# Patient Record
Sex: FEMALE | Race: BLACK OR AFRICAN AMERICAN | Employment: UNEMPLOYED | URBAN - METROPOLITAN AREA
[De-identification: names, ages, dates, MRNs, and addresses within clinical notes are randomized per-mention and may not be internally consistent; named-entity substitution may affect disease eponyms.]

---

## 2017-08-15 ENCOUNTER — OP HISTORICAL/CONVERTED ENCOUNTER (OUTPATIENT)
Dept: OTHER | Age: 27
End: 2017-08-15

## 2017-09-07 ENCOUNTER — IP HISTORICAL/CONVERTED ENCOUNTER (OUTPATIENT)
Dept: OTHER | Age: 27
End: 2017-09-07

## 2019-09-25 ENCOUNTER — IP HISTORICAL/CONVERTED ENCOUNTER (OUTPATIENT)
Dept: OTHER | Age: 29
End: 2019-09-25

## 2020-10-20 LAB
ANTIBODY SCREEN, EXTERNAL: NEGATIVE
HBSAG, EXTERNAL: NEGATIVE
RPR, EXTERNAL: NON REACTIVE
RUBELLA, EXTERNAL: NORMAL
TYPE, ABO & RH, EXTERNAL: NORMAL

## 2020-10-22 ENCOUNTER — ROUTINE PRENATAL (OUTPATIENT)
Dept: OBGYN CLINIC | Age: 30
End: 2020-10-22
Payer: MEDICAID

## 2020-10-22 VITALS
WEIGHT: 136 LBS | DIASTOLIC BLOOD PRESSURE: 69 MMHG | BODY MASS INDEX: 25.03 KG/M2 | HEIGHT: 62 IN | SYSTOLIC BLOOD PRESSURE: 110 MMHG

## 2020-10-22 DIAGNOSIS — Z34.83 MULTIGRAVIDA IN THIRD TRIMESTER: Primary | ICD-10-CM

## 2020-10-22 DIAGNOSIS — O09.33 PRENATAL CARE INSUFFICIENT, THIRD TRIMESTER: ICD-10-CM

## 2020-10-22 PROCEDURE — 0502F SUBSEQUENT PRENATAL CARE: CPT | Performed by: OBSTETRICS & GYNECOLOGY

## 2020-10-22 PROCEDURE — MISCGLOBALOB GLOBAL OB: Performed by: OBSTETRICS & GYNECOLOGY

## 2020-10-22 NOTE — PROGRESS NOTES
Extremely limited prenatal care secondary to insurance reasons    Denies bleeding ctxs, leakage. Reports normal fetal movement    Fetal tone, breathing movement noted.       Normal AFV - RUQ pocket 6 cm

## 2020-10-28 ENCOUNTER — TELEPHONE (OUTPATIENT)
Dept: OBGYN CLINIC | Age: 30
End: 2020-10-28

## 2020-10-29 LAB
ABO GROUP BLD: ABNORMAL
ABOUT THE TEST: NORMAL
APPEARANCE UR: CLEAR
BASOPHILS # BLD AUTO: 0 X10E3/UL (ref 0–0.2)
BASOPHILS NFR BLD AUTO: 0 %
BILIRUB UR QL STRIP: NEGATIVE
BLD GP AB SCN SERPL QL: NEGATIVE
CFTR MUT ANL BLD/T: NORMAL
COLOR UR: YELLOW
EOSINOPHIL # BLD AUTO: 0 X10E3/UL (ref 0–0.4)
EOSINOPHIL NFR BLD AUTO: 1 %
ERYTHROCYTE [DISTWIDTH] IN BLOOD BY AUTOMATED COUNT: 15.6 % (ref 11.7–15.4)
FET TS 13 RISK PLAS.CFDNA QL: NEGATIVE
FETAL FRACTION: NORMAL
FETAL SEX: NORMAL
GA EST FROM CONCEPTION DATE: NORMAL D
GENE DIS ANL CARRIER INTERP-IMP: NORMAL
GESTATIONAL AGE >=9W: YES
GLUCOSE UR QL: NEGATIVE
HBV SURFACE AG SERPL QL IA: NEGATIVE
HCT VFR BLD AUTO: 21.7 % (ref 34–46.6)
HGB A MFR BLD: 98.1 % (ref 96.4–98.8)
HGB A2 MFR BLD COLUMN CHROM: 1.9 % (ref 1.8–3.2)
HGB BLD-MCNC: 6.9 G/DL (ref 11.1–15.9)
HGB C MFR BLD: 0 %
HGB F MFR BLD: 0 % (ref 0–2)
HGB FRACT BLD-IMP: NORMAL
HGB OTHER MFR BLD HPLC: 0 %
HGB S BLD QL SOLY: NEGATIVE
HGB S MFR BLD: 0 %
HGB UR QL STRIP: NEGATIVE
IMM GRANULOCYTES # BLD AUTO: 0 X10E3/UL (ref 0–0.1)
IMM GRANULOCYTES NFR BLD AUTO: 0 %
KETONES UR QL STRIP: NEGATIVE
LAB DIRECTOR NAME PROVIDER: NORMAL
LAB DIRECTOR NAME PROVIDER: NORMAL
LEUKOCYTE ESTERASE UR QL STRIP: NEGATIVE
LIMITATIONS OF THE TEST: NORMAL
LYMPHOCYTES # BLD AUTO: 1.9 X10E3/UL (ref 0.7–3.1)
LYMPHOCYTES NFR BLD AUTO: 31 %
MCH RBC QN AUTO: 22.8 PG (ref 26.6–33)
MCHC RBC AUTO-ENTMCNC: 31.8 G/DL (ref 31.5–35.7)
MCV RBC AUTO: 72 FL (ref 79–97)
MICRO URNS: ABNORMAL
MONOCYTES # BLD AUTO: 0.5 X10E3/UL (ref 0.1–0.9)
MONOCYTES NFR BLD AUTO: 8 %
NEGATIVE PREDICTIVE VALUE: NORMAL
NEUTROPHILS # BLD AUTO: 3.7 X10E3/UL (ref 1.4–7)
NEUTROPHILS NFR BLD AUTO: 60 %
NITRITE UR QL STRIP: NEGATIVE
NOTE: NORMAL
PERFORMANCE CHARACTERISTICS: NORMAL
PH UR STRIP: 6.5 [PH] (ref 5–7.5)
PLATELET # BLD AUTO: 269 X10E3/UL (ref 150–450)
POSITIVE PREDICTIVE VALUE: NORMAL
PROT UR QL STRIP: NEGATIVE
RBC # BLD AUTO: 3.03 X10E6/UL (ref 3.77–5.28)
REF LAB TEST METHOD: NORMAL
REFERENCES: NORMAL
RESULT, 451942: NEGATIVE
RH BLD: POSITIVE
RPR SER QL: NON REACTIVE
RUBV IGG SERPL IA-ACNC: 1.2 INDEX
SP GR UR: 1.01 (ref 1–1.03)
TEST PERFORMANCE INFO SPEC: NORMAL
TRISOMY 18 (EDWARDS SYNDROME): NEGATIVE
TRISOMY 21 (DOWN SYNDROME): NEGATIVE
UROBILINOGEN UR STRIP-MCNC: 0.2 MG/DL (ref 0.2–1)
WBC # BLD AUTO: 6.1 X10E3/UL (ref 3.4–10.8)

## 2020-11-04 ENCOUNTER — ROUTINE PRENATAL (OUTPATIENT)
Dept: OBGYN CLINIC | Age: 30
End: 2020-11-04
Payer: MEDICAID

## 2020-11-04 VITALS
SYSTOLIC BLOOD PRESSURE: 132 MMHG | HEIGHT: 66 IN | WEIGHT: 138 LBS | DIASTOLIC BLOOD PRESSURE: 52 MMHG | BODY MASS INDEX: 22.18 KG/M2

## 2020-11-04 DIAGNOSIS — O99.013 ANEMIA OF PREGNANCY IN THIRD TRIMESTER: ICD-10-CM

## 2020-11-04 DIAGNOSIS — O09.33 PRENATAL CARE INSUFFICIENT, THIRD TRIMESTER: ICD-10-CM

## 2020-11-04 DIAGNOSIS — Z34.83 MULTIGRAVIDA IN THIRD TRIMESTER: Primary | ICD-10-CM

## 2020-11-04 PROCEDURE — 0502F SUBSEQUENT PRENATAL CARE: CPT | Performed by: OBSTETRICS & GYNECOLOGY

## 2020-11-04 PROCEDURE — 59025 FETAL NON-STRESS TEST: CPT | Performed by: OBSTETRICS & GYNECOLOGY

## 2020-11-04 RX ORDER — DOCUSATE SODIUM 100 MG/1
100 CAPSULE, LIQUID FILLED ORAL 2 TIMES DAILY
Qty: 60 CAP | Refills: 5 | Status: SHIPPED | OUTPATIENT
Start: 2020-11-04 | End: 2021-02-03 | Stop reason: ALTCHOICE

## 2020-11-04 RX ORDER — LANOLIN ALCOHOL/MO/W.PET/CERES
325 CREAM (GRAM) TOPICAL
Qty: 90 TAB | Refills: 5 | Status: SHIPPED | OUTPATIENT
Start: 2020-11-04 | End: 2021-02-03 | Stop reason: ALTCHOICE

## 2020-11-04 NOTE — PROGRESS NOTES
Labs reviewed: Marked anemia of pregnancy with hemoglobin 6.9we will start iron 325 mg p.o. 3 times daily.   MFM scan tomorrow  Consider for induction of labor November 9, 2020

## 2020-11-04 NOTE — PROGRESS NOTES
Spoke with provider who advised to send patient iron 325mg tid and colace. Hgb is 6.9 not within normal limits. Spoke with patient and advised of low hgb and to watch for bleeding, lightheadedness and dizziness. Advised to start taking the iron today. Discussed constipation with patient and to drink lots of water and take colace.

## 2020-11-05 NOTE — PROGRESS NOTES
PT seen in office, results given, scheduled to see M 11/05/2020, scheduled for induction on 11/09/2020.

## 2020-11-08 ENCOUNTER — HOSPITAL ENCOUNTER (INPATIENT)
Age: 30
LOS: 4 days | Discharge: HOME | DRG: 560 | End: 2020-11-12
Attending: OBSTETRICS & GYNECOLOGY | Admitting: OBSTETRICS & GYNECOLOGY
Payer: MEDICAID

## 2020-11-08 DIAGNOSIS — Z37.9 VACUUM-ASSISTED VAGINAL DELIVERY: Primary | ICD-10-CM

## 2020-11-08 LAB
BASOPHILS # BLD: 0 K/UL (ref 0–0.1)
BASOPHILS NFR BLD: 0 % (ref 0–1)
DIFFERENTIAL METHOD BLD: ABNORMAL
EOSINOPHIL # BLD: 0 K/UL (ref 0–0.4)
EOSINOPHIL NFR BLD: 0 % (ref 0–7)
ERYTHROCYTE [DISTWIDTH] IN BLOOD BY AUTOMATED COUNT: 17.3 % (ref 11.5–14.5)
HCT VFR BLD AUTO: 22.7 % (ref 35–47)
HGB BLD-MCNC: 6.7 G/DL (ref 11.5–16)
IMM GRANULOCYTES # BLD AUTO: 0 K/UL (ref 0–0.04)
IMM GRANULOCYTES NFR BLD AUTO: 1 % (ref 0–0.5)
LYMPHOCYTES # BLD: 1.3 K/UL (ref 0.8–3.5)
LYMPHOCYTES NFR BLD: 20 % (ref 12–49)
MCH RBC QN AUTO: 21.9 PG (ref 26–34)
MCHC RBC AUTO-ENTMCNC: 29.5 G/DL (ref 30–36.5)
MCV RBC AUTO: 74.2 FL (ref 80–99)
MONOCYTES # BLD: 0.5 K/UL (ref 0–1)
MONOCYTES NFR BLD: 8 % (ref 5–13)
NEUTS SEG # BLD: 4.8 K/UL (ref 1.8–8)
NEUTS SEG NFR BLD: 71 % (ref 32–75)
PLATELET # BLD AUTO: 263 K/UL (ref 150–400)
PMV BLD AUTO: 8.9 FL (ref 8.9–12.9)
RBC # BLD AUTO: 3.06 M/UL (ref 3.8–5.2)
WBC # BLD AUTO: 6.6 K/UL (ref 3.6–11)

## 2020-11-08 PROCEDURE — 87389 HIV-1 AG W/HIV-1&-2 AB AG IA: CPT

## 2020-11-08 PROCEDURE — 74011250637 HC RX REV CODE- 250/637: Performed by: OBSTETRICS & GYNECOLOGY

## 2020-11-08 PROCEDURE — 87635 SARS-COV-2 COVID-19 AMP PRB: CPT

## 2020-11-08 PROCEDURE — 65410000002 HC RM PRIVATE OB

## 2020-11-08 PROCEDURE — 85025 COMPLETE CBC W/AUTO DIFF WBC: CPT

## 2020-11-08 PROCEDURE — 59200 INSERT CERVICAL DILATOR: CPT

## 2020-11-08 PROCEDURE — 36415 COLL VENOUS BLD VENIPUNCTURE: CPT

## 2020-11-08 PROCEDURE — 87070 CULTURE OTHR SPECIMN AEROBIC: CPT

## 2020-11-08 PROCEDURE — 3E0P7VZ INTRODUCTION OF HORMONE INTO FEMALE REPRODUCTIVE, VIA NATURAL OR ARTIFICIAL OPENING: ICD-10-PCS | Performed by: OBSTETRICS & GYNECOLOGY

## 2020-11-08 PROCEDURE — 86900 BLOOD TYPING SEROLOGIC ABO: CPT

## 2020-11-08 RX ORDER — ONDANSETRON 2 MG/ML
4 INJECTION INTRAMUSCULAR; INTRAVENOUS
Status: DISCONTINUED | OUTPATIENT
Start: 2020-11-08 | End: 2020-11-10 | Stop reason: HOSPADM

## 2020-11-08 RX ORDER — MINERAL OIL
120 OIL (ML) ORAL AS NEEDED
Status: DISCONTINUED | OUTPATIENT
Start: 2020-11-08 | End: 2020-11-10 | Stop reason: HOSPADM

## 2020-11-08 RX ORDER — ZOLPIDEM TARTRATE 5 MG/1
5 TABLET ORAL
Status: DISCONTINUED | OUTPATIENT
Start: 2020-11-08 | End: 2020-11-10 | Stop reason: SDUPTHER

## 2020-11-08 RX ORDER — SODIUM CHLORIDE 0.9 % (FLUSH) 0.9 %
5-40 SYRINGE (ML) INJECTION EVERY 8 HOURS
Status: DISCONTINUED | OUTPATIENT
Start: 2020-11-08 | End: 2020-11-13 | Stop reason: HOSPADM

## 2020-11-08 RX ORDER — PENICILLIN G 3000000 [IU]/50ML
3 INJECTION, SOLUTION INTRAVENOUS EVERY 4 HOURS
Status: DISCONTINUED | OUTPATIENT
Start: 2020-11-08 | End: 2020-11-10 | Stop reason: HOSPADM

## 2020-11-08 RX ORDER — SODIUM CHLORIDE 0.9 % (FLUSH) 0.9 %
5-40 SYRINGE (ML) INJECTION AS NEEDED
Status: DISCONTINUED | OUTPATIENT
Start: 2020-11-08 | End: 2020-11-13 | Stop reason: HOSPADM

## 2020-11-08 RX ORDER — NALOXONE HYDROCHLORIDE 0.4 MG/ML
0.4 INJECTION, SOLUTION INTRAMUSCULAR; INTRAVENOUS; SUBCUTANEOUS AS NEEDED
Status: DISCONTINUED | OUTPATIENT
Start: 2020-11-08 | End: 2020-11-10 | Stop reason: HOSPADM

## 2020-11-08 RX ORDER — LIDOCAINE HYDROCHLORIDE 10 MG/ML
50 INJECTION INFILTRATION; PERINEURAL AS NEEDED
Status: DISCONTINUED | OUTPATIENT
Start: 2020-11-08 | End: 2020-11-10 | Stop reason: HOSPADM

## 2020-11-08 RX ORDER — OXYTOCIN/0.9 % SODIUM CHLORIDE 30/500 ML
95 PLASTIC BAG, INJECTION (ML) INTRAVENOUS AS NEEDED
Status: COMPLETED | OUTPATIENT
Start: 2020-11-08 | End: 2020-11-10

## 2020-11-08 RX ORDER — OXYTOCIN/RINGER'S LACTATE 30/500 ML
10 PLASTIC BAG, INJECTION (ML) INTRAVENOUS AS NEEDED
Status: DISCONTINUED | OUTPATIENT
Start: 2020-11-08 | End: 2020-11-10

## 2020-11-08 RX ORDER — ACETAMINOPHEN 325 MG/1
650 TABLET ORAL
Status: DISCONTINUED | OUTPATIENT
Start: 2020-11-08 | End: 2020-11-10 | Stop reason: HOSPADM

## 2020-11-08 RX ADMIN — DINOPROSTONE 10 MG: 10 INSERT VAGINAL at 18:25

## 2020-11-08 NOTE — H&P
History & Physical    Name: Rl Mcmahon MRN: [id number]  SSN: [id number]    YOB: 1990  Age: 34 y.o. Sex: female        Subjective: induction of labor     Estimated Date of Delivery: 20  OB History    Para Term  AB Living   3 2 2 0 0 2   SAB TAB Ectopic Molar Multiple Live Births   0 0 0 0 0 2      # Outcome Date GA Lbr Ortega/2nd Weight Sex Delivery Anes PTL Lv   3 Current            2 Term 19 39w2d  3.005 kg F Vag-Spont   DE   1 Term 17 40w0d  2.835 kg F Vag-Spont   DE       Ms. Jw Lovell is admitted with pregnancy at 40w1d for induction of labor. Prenatal course was normal. Please see prenatal records for details. No past medical history on file. No past surgical history on file. Social History     Occupational History    Not on file   Tobacco Use    Smoking status: Never Smoker    Smokeless tobacco: Never Used   Substance and Sexual Activity    Alcohol use: Never     Frequency: Never    Drug use: Never    Sexual activity: Yes     No family history on file. No Known Allergies  Prior to Admission medications    Medication Sig Start Date End Date Taking? Authorizing Provider   docusate sodium (COLACE) 100 mg capsule Take 1 Cap by mouth two (2) times a day for 180 days. 11/4/20 5/3/21  Miroslava Shaw MD   ferrous sulfate 325 mg (65 mg iron) tablet Take 1 Tab by mouth three (3) times daily (with meals). 20   Miroslava Shaw MD        Review of Systems: A comprehensive review of systems was negative except for that written in the HPI. Objective:     Vitals:  Vitals:    20 1632 20 1633   BP:  118/67   Pulse:  93   Resp:  16   Temp:  98.4 °F (36.9 °C)   Weight: 62.6 kg (138 lb)    Height: 5' (1.524 m)         Physical Exam:  Patient without distress.   Abdomen: soft, nontender  Fundus: soft and non tender  Perineum: blood absent, amniotic fluid absent  Cervical Exam: 1 cm dilated    Membranes:  Intact  Fetal Heart Rate: Reactive    Prenatal Labs: Lab Results   Component Value Date/Time    Rubella, External immune 10/20/2020    HBsAg, External negative 10/20/2020    RPR, External non reactive 10/20/2020    ABO,Rh B+ 10/20/2020         Assessment/Plan:     Active Problems:    Normal delivery (11/8/2020)         Plan: Admit for Reassuring fetal status. Group B Strep was obtained results pending. Cervadil placed.

## 2020-11-09 ENCOUNTER — ANESTHESIA EVENT (OUTPATIENT)
Dept: LABOR AND DELIVERY | Age: 30
DRG: 560 | End: 2020-11-09
Payer: MEDICAID

## 2020-11-09 ENCOUNTER — ANESTHESIA (OUTPATIENT)
Dept: LABOR AND DELIVERY | Age: 30
DRG: 560 | End: 2020-11-09
Payer: MEDICAID

## 2020-11-09 VITALS
OXYGEN SATURATION: 100 % | HEART RATE: 100 BPM | DIASTOLIC BLOOD PRESSURE: 71 MMHG | SYSTOLIC BLOOD PRESSURE: 138 MMHG | RESPIRATION RATE: 19 BRPM

## 2020-11-09 LAB
HIV 1+2 AB+HIV1 P24 AG SERPL QL IA: NONREACTIVE
HIV12 RESULT COMMENT, HHIVC: NORMAL
SARS-COV-2, COV2: NORMAL

## 2020-11-09 PROCEDURE — 65410000002 HC RM PRIVATE OB

## 2020-11-09 PROCEDURE — 00HU33Z INSERTION OF INFUSION DEVICE INTO SPINAL CANAL, PERCUTANEOUS APPROACH: ICD-10-PCS | Performed by: ANESTHESIOLOGY

## 2020-11-09 PROCEDURE — 74011250636 HC RX REV CODE- 250/636: Performed by: NURSE ANESTHETIST, CERTIFIED REGISTERED

## 2020-11-09 PROCEDURE — 74011000258 HC RX REV CODE- 258: Performed by: OBSTETRICS & GYNECOLOGY

## 2020-11-09 PROCEDURE — 74011000250 HC RX REV CODE- 250

## 2020-11-09 PROCEDURE — 74011000250 HC RX REV CODE- 250: Performed by: NURSE ANESTHETIST, CERTIFIED REGISTERED

## 2020-11-09 PROCEDURE — 74011250636 HC RX REV CODE- 250/636: Performed by: OBSTETRICS & GYNECOLOGY

## 2020-11-09 PROCEDURE — 59200 INSERT CERVICAL DILATOR: CPT

## 2020-11-09 PROCEDURE — 74011250637 HC RX REV CODE- 250/637: Performed by: OBSTETRICS & GYNECOLOGY

## 2020-11-09 RX ORDER — CALCIUM CARBONATE 200(500)MG
200 TABLET,CHEWABLE ORAL
Status: DISCONTINUED | OUTPATIENT
Start: 2020-11-09 | End: 2020-11-13 | Stop reason: HOSPADM

## 2020-11-09 RX ORDER — TERBUTALINE SULFATE 1 MG/ML
0.25 INJECTION SUBCUTANEOUS
Status: COMPLETED | OUTPATIENT
Start: 2020-11-09 | End: 2020-11-09

## 2020-11-09 RX ORDER — FENTANYL CITRATE 50 UG/ML
INJECTION, SOLUTION INTRAMUSCULAR; INTRAVENOUS AS NEEDED
Status: DISCONTINUED | OUTPATIENT
Start: 2020-11-09 | End: 2020-11-10 | Stop reason: HOSPADM

## 2020-11-09 RX ORDER — BUPIVACAINE HYDROCHLORIDE 2.5 MG/ML
INJECTION, SOLUTION EPIDURAL; INFILTRATION; INTRACAUDAL AS NEEDED
Status: DISCONTINUED | OUTPATIENT
Start: 2020-11-09 | End: 2020-11-10 | Stop reason: HOSPADM

## 2020-11-09 RX ORDER — BUPIVACAINE HYDROCHLORIDE 2.5 MG/ML
INJECTION, SOLUTION EPIDURAL; INFILTRATION; INTRACAUDAL
Status: COMPLETED
Start: 2020-11-09 | End: 2020-11-09

## 2020-11-09 RX ORDER — FENTANYL 0.2 MG/100ML-BUPIV 0.125%-NACL 0.9% EPIDURAL INJ 2/0.125 MCG/ML-%
1-16 SOLUTION INJECTION CONTINUOUS
Status: DISCONTINUED | OUTPATIENT
Start: 2020-11-09 | End: 2020-11-10 | Stop reason: HOSPADM

## 2020-11-09 RX ORDER — SODIUM CHLORIDE, SODIUM LACTATE, POTASSIUM CHLORIDE, CALCIUM CHLORIDE 600; 310; 30; 20 MG/100ML; MG/100ML; MG/100ML; MG/100ML
125 INJECTION, SOLUTION INTRAVENOUS CONTINUOUS
Status: DISCONTINUED | OUTPATIENT
Start: 2020-11-09 | End: 2020-11-13 | Stop reason: HOSPADM

## 2020-11-09 RX ORDER — NALOXONE HYDROCHLORIDE 0.4 MG/ML
0.4 INJECTION, SOLUTION INTRAMUSCULAR; INTRAVENOUS; SUBCUTANEOUS AS NEEDED
Status: DISCONTINUED | OUTPATIENT
Start: 2020-11-09 | End: 2020-11-10 | Stop reason: HOSPADM

## 2020-11-09 RX ORDER — NALBUPHINE HYDROCHLORIDE 10 MG/ML
2.5 INJECTION, SOLUTION INTRAMUSCULAR; INTRAVENOUS; SUBCUTANEOUS
Status: DISCONTINUED | OUTPATIENT
Start: 2020-11-09 | End: 2020-11-09

## 2020-11-09 RX ORDER — FENTANYL CITRATE 50 UG/ML
INJECTION, SOLUTION INTRAMUSCULAR; INTRAVENOUS
Status: COMPLETED
Start: 2020-11-09 | End: 2020-11-09

## 2020-11-09 RX ORDER — NORETHINDRONE AND ETHINYL ESTRADIOL 0.5-0.035
12.5 KIT ORAL
Status: DISCONTINUED | OUTPATIENT
Start: 2020-11-09 | End: 2020-11-10 | Stop reason: HOSPADM

## 2020-11-09 RX ORDER — CALCIUM CARBONATE 200(500)MG
200 TABLET,CHEWABLE ORAL
Status: DISCONTINUED | OUTPATIENT
Start: 2020-11-09 | End: 2020-11-09

## 2020-11-09 RX ORDER — FENTANYL 0.2 MG/100ML-BUPIV 0.125%-NACL 0.9% EPIDURAL INJ 2/0.125 MCG/ML-%
SOLUTION INJECTION
Status: COMPLETED
Start: 2020-11-09 | End: 2020-11-09

## 2020-11-09 RX ORDER — FENTANYL CITRATE 50 UG/ML
50 INJECTION, SOLUTION INTRAMUSCULAR; INTRAVENOUS
Status: ACTIVE | OUTPATIENT
Start: 2020-11-09 | End: 2020-11-12

## 2020-11-09 RX ORDER — FENTANYL CITRATE 50 UG/ML
100 INJECTION, SOLUTION INTRAMUSCULAR; INTRAVENOUS ONCE
Status: DISPENSED | OUTPATIENT
Start: 2020-11-09 | End: 2020-11-10

## 2020-11-09 RX ADMIN — PENICILLIN G 3 MILLION UNITS: 3000000 INJECTION, SOLUTION INTRAVENOUS at 18:49

## 2020-11-09 RX ADMIN — SODIUM CHLORIDE 5 MILLION UNITS: 900 INJECTION INTRAVENOUS at 05:52

## 2020-11-09 RX ADMIN — SODIUM CHLORIDE, POTASSIUM CHLORIDE, SODIUM LACTATE AND CALCIUM CHLORIDE 125 ML/HR: 600; 310; 30; 20 INJECTION, SOLUTION INTRAVENOUS at 09:00

## 2020-11-09 RX ADMIN — Medication 10 ML: at 23:21

## 2020-11-09 RX ADMIN — SODIUM CHLORIDE, POTASSIUM CHLORIDE, SODIUM LACTATE AND CALCIUM CHLORIDE 125 ML/HR: 600; 310; 30; 20 INJECTION, SOLUTION INTRAVENOUS at 22:47

## 2020-11-09 RX ADMIN — BUPIVACAINE HYDROCHLORIDE 7 ML: 2.5 INJECTION, SOLUTION EPIDURAL; INFILTRATION; INTRACAUDAL at 22:28

## 2020-11-09 RX ADMIN — MISOPROSTOL 25 MCG: 100 TABLET ORAL at 08:15

## 2020-11-09 RX ADMIN — BUPIVACAINE HYDROCHLORIDE 6 ML: 2.5 INJECTION, SOLUTION EPIDURAL; INFILTRATION; INTRACAUDAL at 14:47

## 2020-11-09 RX ADMIN — FENTANYL CITRATE 100 MCG: 50 INJECTION, SOLUTION INTRAMUSCULAR; INTRAVENOUS at 14:55

## 2020-11-09 RX ADMIN — FENTANYL CITRATE 100 MCG: 50 INJECTION, SOLUTION INTRAMUSCULAR; INTRAVENOUS at 18:42

## 2020-11-09 RX ADMIN — SODIUM CHLORIDE, POTASSIUM CHLORIDE, SODIUM LACTATE AND CALCIUM CHLORIDE 125 ML/HR: 600; 310; 30; 20 INJECTION, SOLUTION INTRAVENOUS at 15:59

## 2020-11-09 RX ADMIN — SODIUM CHLORIDE, POTASSIUM CHLORIDE, SODIUM LACTATE AND CALCIUM CHLORIDE 125 ML/HR: 600; 310; 30; 20 INJECTION, SOLUTION INTRAVENOUS at 18:48

## 2020-11-09 RX ADMIN — MISOPROSTOL 25 MCG: 100 TABLET ORAL at 12:57

## 2020-11-09 RX ADMIN — TERBUTALINE SULFATE 0.25 MG: 1 INJECTION, SOLUTION SUBCUTANEOUS at 14:25

## 2020-11-09 RX ADMIN — PENICILLIN G 3 MILLION UNITS: 3000000 INJECTION, SOLUTION INTRAVENOUS at 23:15

## 2020-11-09 RX ADMIN — BUPIVACAINE HYDROCHLORIDE: 2.5 INJECTION, SOLUTION EPIDURAL; INFILTRATION; INTRACAUDAL at 23:00

## 2020-11-09 RX ADMIN — TERBUTALINE SULFATE 0.25 MG: 1 INJECTION, SOLUTION SUBCUTANEOUS at 09:08

## 2020-11-09 RX ADMIN — PENICILLIN G 3 MILLION UNITS: 3000000 INJECTION, SOLUTION INTRAVENOUS at 12:00

## 2020-11-09 RX ADMIN — Medication 10 ML/HR: at 16:59

## 2020-11-09 RX ADMIN — PENICILLIN G 3 MILLION UNITS: 3000000 INJECTION, SOLUTION INTRAVENOUS at 10:39

## 2020-11-09 RX ADMIN — BUPIVACAINE HYDROCHLORIDE 7 ML: 2.5 INJECTION, SOLUTION EPIDURAL; INFILTRATION; INTRACAUDAL at 18:42

## 2020-11-09 RX ADMIN — FENTANYL 0.2 MG/100ML-BUPIV 0.125%-NACL 0.9% EPIDURAL INJ 10 ML/HR: 2/0.125 SOLUTION at 16:59

## 2020-11-09 NOTE — PROGRESS NOTES
1900-assumed care of pt. Pt resting in bed. No support at this time. Pt states that she will not have any visitors tonight. Cervidil placed on am shift. Pt aware of plan of care for the night. Will monitor. 1915-covid screen completed and walked to lab. gbs completed. 2030-pt up to br    2256- pt up to br    0222-pt up to br    0531-tracing maternal    0538-tracing maternal    0548- pt up to br    0600-cervidil removed with some difficulty due to pts inability to relax. Unable to check cervix at this time. Pt requests to rest at this time. 0623-tracing maternal. Monitor repositioned.

## 2020-11-09 NOTE — ANESTHESIA PREPROCEDURE EVALUATION
Relevant Problems   No relevant active problems       Anesthetic History   No history of anesthetic complications            Review of Systems / Medical History  Patient summary reviewed, nursing notes reviewed and pertinent labs reviewed    Pulmonary  Within defined limits                 Neuro/Psych   Within defined limits           Cardiovascular  Within defined limits                     GI/Hepatic/Renal  Within defined limits              Endo/Other  Within defined limits           Other Findings              Physical Exam    Airway  Mallampati: II           Cardiovascular    Rhythm: regular           Dental  No notable dental hx       Pulmonary                 Abdominal         Other Findings            Anesthetic Plan    ASA: 2  Anesthesia type: epidural            Anesthetic plan and risks discussed with: Patient

## 2020-11-09 NOTE — PROGRESS NOTES
0710- BEDSIDE REPORT RECEIVED FROM PREVIOUS SHIFT. PLAN OF CARE DISCUSSED WITH THE PATIENT AT THIS TIME. ALL QUESTIONS ANSWERED. WILL CONTINUE TO MONITOR.      0830-DR HUDDLESTON AT THE BEDSIDE. POC DISCUSSED WITH THE PATIENT AT THIS TIME. PATIENT UNDERSTANDS THE USE OF CYTOTEC. VAGINAL CYTOTEC PLACED AT THIS TIME. WILL CONTINUE TO MONITOR. VERBAL GIVEN FOR THE PATIENT TO HAVE A REGULAR TRAY FOR BREAKFAST. 1205-PATIENT UP TO BATHROOM AND TO AMBULATE IN HER ROOM TO HELP WITH STIFFNESS. 1410-DR HUDDLESTON MADE AWARE OF SROM WITH CLEAR FLUID. HEAVY BLOODY SHOW NOTED AT THIS TIME. VE 8CM DILATED. STATES TO GET THE PATIENT A EPIDURAL AND HE WILL COME TO THE FLOOR TO EVALUATE PATIENT. 1532-DR HUDDLESTON AT BEDSIDE TO EVALUATE PATIENT AND STRIP. BLOODY AMNIOTIC FLUID NOTED AT THIS TIME ON PATIENTS PAD. PADS CHANGED AND STRIP EVALUATED.  DR HUDDLESTON MADE AWARE OF INTERVENTIONS DONE FOR TACHYSYSTOLE WITH FETAL DECELERATION

## 2020-11-09 NOTE — ANESTHESIA PROCEDURE NOTES
Epidural Block    Start time: 11/9/2020 2:42 PM  End time: 11/9/2020 2:55 PM  Performed by: Karsten Devine CRNA  Authorized by: Karsten Devine CRNA     Pre-Procedure  Indication: labor epidural    Preanesthetic Checklist: patient identified, risks and benefits discussed, anesthesia consent, site marked, patient being monitored, timeout performed and anesthesia consent    Timeout Time: 14:42        Epidural:   Patient position:  Left lateral decubitus  Prep region:  Lumbar  Prep: Patient draped and Chlorhexidine    Location:  L2-3    Needle and Epidural Catheter:   Needle Type:  Tuohy  Needle Gauge:  18 G  Injection Technique:  Loss of resistance using saline  Attempts:  1  Catheter at Skin Depth (cm):  10  Depth in Epidural Space (cm):  6  Events: no blood with aspiration, no cerebrospinal fluid with aspiration, no paresthesia and negative aspiration test      Assessment:   Catheter Secured:  Tape and tegaderm  Insertion:  Uncomplicated  Patient tolerance:  Patient tolerated the procedure well with no immediate complications

## 2020-11-10 LAB
ABO + RH BLD: NORMAL
BLOOD GROUP ANTIBODIES SERPL: NEGATIVE
HCT VFR BLD AUTO: 27.4 % (ref 35–47)
HGB BLD-MCNC: 8 G/DL (ref 11.5–16)
SARS-COV-2, COV2NT: NOT DETECTED
SPECIMEN EXP DATE BLD: NORMAL

## 2020-11-10 PROCEDURE — 75410000003 HC RECOV DEL/VAG/CSECN EA 0.5 HR

## 2020-11-10 PROCEDURE — 76060000078 HC EPIDURAL ANESTHESIA

## 2020-11-10 PROCEDURE — 75410000000 HC DELIVERY VAGINAL/SINGLE

## 2020-11-10 PROCEDURE — 85018 HEMOGLOBIN: CPT

## 2020-11-10 PROCEDURE — 74011250637 HC RX REV CODE- 250/637

## 2020-11-10 PROCEDURE — 59410 OBSTETRICAL CARE: CPT | Performed by: OBSTETRICS & GYNECOLOGY

## 2020-11-10 PROCEDURE — 0KQM0ZZ REPAIR PERINEUM MUSCLE, OPEN APPROACH: ICD-10-PCS | Performed by: OBSTETRICS & GYNECOLOGY

## 2020-11-10 PROCEDURE — 74011250636 HC RX REV CODE- 250/636: Performed by: OBSTETRICS & GYNECOLOGY

## 2020-11-10 PROCEDURE — 75410000002 HC LABOR FEE PER 1 HR

## 2020-11-10 PROCEDURE — 74011250637 HC RX REV CODE- 250/637: Performed by: OBSTETRICS & GYNECOLOGY

## 2020-11-10 PROCEDURE — 65410000002 HC RM PRIVATE OB

## 2020-11-10 PROCEDURE — 88307 TISSUE EXAM BY PATHOLOGIST: CPT

## 2020-11-10 RX ORDER — HYDROCORTISONE ACETATE PRAMOXINE HCL 1; 1 G/100G; G/100G
CREAM TOPICAL AS NEEDED
Status: DISCONTINUED | OUTPATIENT
Start: 2020-11-10 | End: 2020-11-13 | Stop reason: HOSPADM

## 2020-11-10 RX ORDER — MISOPROSTOL 200 UG/1
400 TABLET ORAL ONCE
Status: COMPLETED | OUTPATIENT
Start: 2020-11-10 | End: 2020-11-10

## 2020-11-10 RX ORDER — LANOLIN ALCOHOL/MO/W.PET/CERES
1 CREAM (GRAM) TOPICAL
Status: DISCONTINUED | OUTPATIENT
Start: 2020-11-10 | End: 2020-11-13 | Stop reason: HOSPADM

## 2020-11-10 RX ORDER — FERROUS SULFATE 325(65) MG
1 TABLET, DELAYED RELEASE (ENTERIC COATED) ORAL
Status: DISCONTINUED | OUTPATIENT
Start: 2020-11-10 | End: 2020-11-10

## 2020-11-10 RX ORDER — NALOXONE HYDROCHLORIDE 0.4 MG/ML
0.4 INJECTION, SOLUTION INTRAMUSCULAR; INTRAVENOUS; SUBCUTANEOUS AS NEEDED
Status: DISCONTINUED | OUTPATIENT
Start: 2020-11-10 | End: 2020-11-13 | Stop reason: HOSPADM

## 2020-11-10 RX ORDER — MISOPROSTOL 200 UG/1
TABLET ORAL
Status: COMPLETED
Start: 2020-11-10 | End: 2020-11-10

## 2020-11-10 RX ORDER — ACETAMINOPHEN 325 MG/1
650 TABLET ORAL
Status: DISCONTINUED | OUTPATIENT
Start: 2020-11-10 | End: 2020-11-13 | Stop reason: HOSPADM

## 2020-11-10 RX ORDER — ZOLPIDEM TARTRATE 5 MG/1
5 TABLET ORAL
Status: DISCONTINUED | OUTPATIENT
Start: 2020-11-10 | End: 2020-11-13 | Stop reason: HOSPADM

## 2020-11-10 RX ORDER — OXYCODONE AND ACETAMINOPHEN 5; 325 MG/1; MG/1
1 TABLET ORAL
Status: DISCONTINUED | OUTPATIENT
Start: 2020-11-10 | End: 2020-11-13 | Stop reason: HOSPADM

## 2020-11-10 RX ORDER — IBUPROFEN 800 MG/1
800 TABLET ORAL
Status: DISCONTINUED | OUTPATIENT
Start: 2020-11-10 | End: 2020-11-13 | Stop reason: HOSPADM

## 2020-11-10 RX ADMIN — MISOPROSTOL 400 MCG: 200 TABLET ORAL at 00:18

## 2020-11-10 RX ADMIN — Medication 10000 MILLI-UNITS: at 00:07

## 2020-11-10 RX ADMIN — SODIUM CHLORIDE 300 MG: 9 INJECTION, SOLUTION INTRAVENOUS at 07:48

## 2020-11-10 RX ADMIN — IBUPROFEN 800 MG: 800 TABLET, FILM COATED ORAL at 20:28

## 2020-11-10 RX ADMIN — OXYCODONE AND ACETAMINOPHEN 1 TABLET: 5; 325 TABLET ORAL at 06:24

## 2020-11-10 RX ADMIN — ACETAMINOPHEN 650 MG: 325 TABLET, FILM COATED ORAL at 06:24

## 2020-11-10 RX ADMIN — FERROUS SULFATE TAB 325 MG (65 MG ELEMENTAL FE) 325 MG: 325 (65 FE) TAB at 14:50

## 2020-11-10 RX ADMIN — FERROUS SULFATE TAB 325 MG (65 MG ELEMENTAL FE) 325 MG: 325 (65 FE) TAB at 18:18

## 2020-11-10 RX ADMIN — OXYTOCIN-SODIUM CHLORIDE 0.9% IV SOLN 30 UNIT/500ML 95 MILLI-UNITS/MIN: 30-0.9/5 SOLUTION at 00:49

## 2020-11-10 NOTE — PROGRESS NOTES
0422 Patient arrived to unit from L&D in wheelchair in stable condition. Report received from Erick Tejeda RN. Assessed patient and oriented her to her room. Reviewed paperwork. Patient tired and declined signing paperwork at this time. Infant transported to nursery so patient can sleep.    0700 Report given to Isabel Knowles RN.

## 2020-11-10 NOTE — PROGRESS NOTES
Pt transported to [facility] room 326 via w/c with all belongings. Bedside report given to Anel Hendricks RN. Pt rates pain to perineum 1/10. Pitocin continues as ordered. Infant at bedside in open crib.

## 2020-11-10 NOTE — PROGRESS NOTES
11/10/20 0430   Maternal Vital Signs   Temp 99.3 °F (37.4 °C)   Temp Source Oral   Pulse (Heart Rate) (!) 101   Resp Rate 18   O2 Sat (%) 100 %   Level of Consciousness Alert   /73   MAP (Calculated) 94   BP 1 Method Automatic   BP 1 Location Left arm   BP Patient Position At rest   MEWS Score 2   Pain 1   Pain Scale 1 Numeric (0 - 10)   Pain Intensity 1 1   Oxygen Therapy   O2 Device Room air   Pre-Eclampsia Assessment   Respiratory Pattern Regular   Breath Sounds Left Clear   Breath Sounds Right Clear   Patient Observation   Patient Turned Turns self   Activity In bed   Dr. Ulysses Keating notified of pt VSS. New order received: Tylenol 650mg tab po q4h prn and percocet 5/325mg tab po m1damsq.

## 2020-11-10 NOTE — PROGRESS NOTES
Call placed to Dr. Kelly Templeton concerning pt Hemoglobin. New order received: Give Venofer 300mg IV once now and Iron 325mg tab po TID.

## 2020-11-10 NOTE — PROGRESS NOTES
1540 [place]  and spoke to Dr Jhonny Lopez to let him aware pt's current vital signs and pt very sleepy, hgb was 6.7, bleeding has been small every hour. Dr Jhonny Lopez orders H&H now    [facility] assisted patient to bathroom and cleaned up and changed. Patient had accident with BM and couldn't make it to bathroom in time.       1910 Report given to Lamine Guillen

## 2020-11-10 NOTE — PROCEDURES
DELIVERY NOTE    PREOPERATIVE DIAGNOSIS: Intrauterine pregnancy at 40 weeks gestation    POSTOPERATIVE DIAGNOSIS: Intrauterine pregnancy at 40 weeks gestation delivered    PROCEDURE:   Vacuum-assisted delivery  Electronic fetal monitoring    ANESTHESIA: General    ANESTHESIOLOGIST: Claudia Short M.D. SURGEON: Candice Ndiaye M.D.    ASSISTANT:N/A    COMPLICATIONS: Vulvar edema    CONDITION DURING PROCEDURE: Stable    ESTIMATED BLOOD LOSS: 1000 mL    SURGICAL COUNT: Correct    SPECIMEN: Blood, cord gas, placenta    FINDINGS: Viable female infant, cephalic presentation, ROT. Apgar's: 8/9. Weight: 6 pounds 14 ounces. Intact placenta. Three-vessel cord. Terminal meconium, nuchal cord x1    DESCRIPTION OF PROCEDURE: 77-year-old black female,  3 para 2-0-0-2 with an EDC of 2020, estimated gestational age 40-3/7 weeks with limited prenatal care at [facility], admitted to labor and delivery for cervical ripening and induction of labor, received Cervidil, Cytotec x2, SROM, epidural anesthesia, with labor course notable for persistent variable decelerations, managed with Trendelenburg position. Patient initially pushed for approximately 40 minutes, with development of severe vulvar edema, and decision was made to place the patient in Trendelenburg position and allow the patient to labor down. After several hours, patient progressed to +2 station, and pushing resumed. However patient developed a nonreassuring tracing where the maternal heart rate converged with a fetal heart rate making it difficult to differentiate between fetal and maternal heart rates. At +3 station, empty bladder, adequate pelvis, estimated fetal weight 7 pounds, mighty VAC was applied to the fetal head, and the infant was delivered with 2 poles (1 pop-off). Cord x1 noted and reduced prior to delivery of shoulders. Shoulder dystocia noted and managed with Lilian, and suprapubic pressure.   Infant stimulated and dried below placenta for approximately 60 seconds and handed to awaiting pediatric nursing staff. Placenta delivered spontaneously and intact, with three-vessel cord. 500 cc blood loss immediately after placenta delivered. Multiple vulvar lacerations repaired with 2-0 chromic suture. Second-degree perineal laceration repaired with 2-0 Vicryl and 2-0 chromic. Uterine atony noted during repair, and managed with bimanual massage, and 400 mcg of Cytotec.

## 2020-11-10 NOTE — ANESTHESIA POSTPROCEDURE EVALUATION
* No procedures listed *.    epidural    Anesthesia Post Evaluation        Anesthetic complications: no        INITIAL Post-op Vital signs: No vitals data found for the desired time range.

## 2020-11-11 LAB
HCT VFR BLD AUTO: 18.6 % (ref 35–47)
HGB BLD-MCNC: 5.4 G/DL (ref 11.5–16)

## 2020-11-11 PROCEDURE — 74011250637 HC RX REV CODE- 250/637: Performed by: OBSTETRICS & GYNECOLOGY

## 2020-11-11 PROCEDURE — 85018 HEMOGLOBIN: CPT

## 2020-11-11 PROCEDURE — 65410000002 HC RM PRIVATE OB

## 2020-11-11 PROCEDURE — 36415 COLL VENOUS BLD VENIPUNCTURE: CPT

## 2020-11-11 RX ADMIN — IBUPROFEN 800 MG: 800 TABLET, FILM COATED ORAL at 05:52

## 2020-11-11 RX ADMIN — FERROUS SULFATE TAB 325 MG (65 MG ELEMENTAL FE) 325 MG: 325 (65 FE) TAB at 16:09

## 2020-11-11 RX ADMIN — FERROUS SULFATE TAB 325 MG (65 MG ELEMENTAL FE) 325 MG: 325 (65 FE) TAB at 12:25

## 2020-11-11 RX ADMIN — IBUPROFEN 800 MG: 800 TABLET, FILM COATED ORAL at 16:09

## 2020-11-11 RX ADMIN — FERROUS SULFATE TAB 325 MG (65 MG ELEMENTAL FE) 325 MG: 325 (65 FE) TAB at 07:39

## 2020-11-11 NOTE — PROGRESS NOTES
43[address] [facility] to [place] [address] in hematology to verify when H&H was going to be drawn. He states would call someone to come and collect   1320 Spoke to Esteban Marin in Hematology that H&H has not been drawn  1445 Received call from [place] [address] in Hematology with Hgb 5.4. Will notifiy Dr Brina Pollock. 1500 Spoke with Dr Brina Pollock and notified of Hgb 5.4, and previous Hgb 6.7 on 11/8, and 8.0 on 11/10,  pt asymtomatic and has been up walking in room, vaginal bleeding has been small, and patient denies any pain. Dr Brina Pollock orders to do CBC in am.   3[address] [facility] with instructions given to patient.     Bedside shift report given to LUIZ AVILA [facility]

## 2020-11-11 NOTE — PROGRESS NOTES
Progress Note    Patient: Marysol Davis MRN: [id number]  SSN: [id number]    YOB: 1990  Age: 34 y.o. Sex: female      Admit Date: 11/8/2020    LOS: 3 days     Subjective:     Patient is without complaints. She states the dizziness she experienced last night is resolved. She reports minimal lochia. Objective:     Vitals:    11/10/20 1651 11/10/20 2004 11/10/20 2330 11/11/20 0740   BP: 123/71 125/70 107/65 124/71   Pulse: 92 96 (!) 108 100   Resp: 18 18 18 18   Temp: 98.4 °F (36.9 °C) 98.5 °F (36.9 °C) 98 °F (36.7 °C) 97.9 °F (36.6 °C)   SpO2: 100% 100% 100% 99%   Weight:       Height:            Physical Exam:   Fundus is below umbilicus, extremities are without clubbing cyanosis or edema    Lab/Data Review:   Hemoglobin 8.0  Assessment:   Postpartum day #1 status post low vacuum extraction with high blood loss, stable.   Active Problems:    Normal delivery (11/8/2020)        Plan:     Routine postpartum care    Signed By: Anastacio Sargent MD     November 11, 2020

## 2020-11-12 VITALS
BODY MASS INDEX: 27.09 KG/M2 | HEART RATE: 102 BPM | OXYGEN SATURATION: 100 % | TEMPERATURE: 99.2 F | RESPIRATION RATE: 18 BRPM | WEIGHT: 138 LBS | SYSTOLIC BLOOD PRESSURE: 120 MMHG | DIASTOLIC BLOOD PRESSURE: 77 MMHG | HEIGHT: 60 IN

## 2020-11-12 PROBLEM — Z3A.40 40 WEEKS GESTATION OF PREGNANCY: Status: ACTIVE | Noted: 2020-11-12

## 2020-11-12 PROBLEM — Z37.9 VACUUM-ASSISTED VAGINAL DELIVERY: Status: ACTIVE | Noted: 2020-11-12

## 2020-11-12 PROBLEM — Z3A.40 40 WEEKS GESTATION OF PREGNANCY: Status: RESOLVED | Noted: 2020-11-12 | Resolved: 2020-11-12

## 2020-11-12 PROBLEM — D64.9 ACUTE ON CHRONIC ANEMIA: Status: ACTIVE | Noted: 2020-11-12

## 2020-11-12 LAB
BACTERIA SPEC CULT: NORMAL
BASOPHILS # BLD: 0 K/UL (ref 0–0.1)
BASOPHILS # BLD: 0 K/UL (ref 0–0.1)
BASOPHILS NFR BLD: 0 % (ref 0–1)
BASOPHILS NFR BLD: 0 % (ref 0–1)
DIFFERENTIAL METHOD BLD: ABNORMAL
DIFFERENTIAL METHOD BLD: ABNORMAL
EOSINOPHIL # BLD: 0.1 K/UL (ref 0–0.4)
EOSINOPHIL # BLD: 0.1 K/UL (ref 0–0.4)
EOSINOPHIL NFR BLD: 1 % (ref 0–7)
EOSINOPHIL NFR BLD: 1 % (ref 0–7)
ERYTHROCYTE [DISTWIDTH] IN BLOOD BY AUTOMATED COUNT: 19.5 % (ref 11.5–14.5)
ERYTHROCYTE [DISTWIDTH] IN BLOOD BY AUTOMATED COUNT: 21 % (ref 11.5–14.5)
HCT VFR BLD AUTO: 17.3 % (ref 35–47)
HCT VFR BLD AUTO: 28.3 % (ref 35–47)
HGB BLD-MCNC: 5 G/DL (ref 11.5–16)
HGB BLD-MCNC: 8.6 G/DL (ref 11.5–16)
IMM GRANULOCYTES # BLD AUTO: 0.2 K/UL (ref 0–0.04)
IMM GRANULOCYTES # BLD AUTO: 0.3 K/UL (ref 0–0.04)
IMM GRANULOCYTES NFR BLD AUTO: 1 % (ref 0–0.5)
IMM GRANULOCYTES NFR BLD AUTO: 2 % (ref 0–0.5)
LYMPHOCYTES # BLD: 2.3 K/UL (ref 0.8–3.5)
LYMPHOCYTES # BLD: 2.4 K/UL (ref 0.8–3.5)
LYMPHOCYTES NFR BLD: 15 % (ref 12–49)
LYMPHOCYTES NFR BLD: 17 % (ref 12–49)
MCH RBC QN AUTO: 22.1 PG (ref 26–34)
MCH RBC QN AUTO: 24.9 PG (ref 26–34)
MCHC RBC AUTO-ENTMCNC: 28.9 G/DL (ref 30–36.5)
MCHC RBC AUTO-ENTMCNC: 30.4 G/DL (ref 30–36.5)
MCV RBC AUTO: 76.5 FL (ref 80–99)
MCV RBC AUTO: 82 FL (ref 80–99)
MONOCYTES # BLD: 0.7 K/UL (ref 0–1)
MONOCYTES # BLD: 0.8 K/UL (ref 0–1)
MONOCYTES NFR BLD: 5 % (ref 5–13)
MONOCYTES NFR BLD: 5 % (ref 5–13)
NEUTS SEG # BLD: 10.9 K/UL (ref 1.8–8)
NEUTS SEG # BLD: 12.1 K/UL (ref 1.8–8)
NEUTS SEG NFR BLD: 76 % (ref 32–75)
NEUTS SEG NFR BLD: 77 % (ref 32–75)
PLATELET # BLD AUTO: 252 K/UL (ref 150–400)
PLATELET # BLD AUTO: 257 K/UL (ref 150–400)
PMV BLD AUTO: 9 FL (ref 8.9–12.9)
PMV BLD AUTO: 9.6 FL (ref 8.9–12.9)
RBC # BLD AUTO: 2.26 M/UL (ref 3.8–5.2)
RBC # BLD AUTO: 3.45 M/UL (ref 3.8–5.2)
SPECIAL REQUESTS,SREQ: NORMAL
WBC # BLD AUTO: 14.2 K/UL (ref 3.6–11)
WBC # BLD AUTO: 15.3 K/UL (ref 3.6–11)

## 2020-11-12 PROCEDURE — 85025 COMPLETE CBC W/AUTO DIFF WBC: CPT

## 2020-11-12 PROCEDURE — 86900 BLOOD TYPING SEROLOGIC ABO: CPT

## 2020-11-12 PROCEDURE — 36415 COLL VENOUS BLD VENIPUNCTURE: CPT

## 2020-11-12 PROCEDURE — 36430 TRANSFUSION BLD/BLD COMPNT: CPT

## 2020-11-12 PROCEDURE — 74011250637 HC RX REV CODE- 250/637: Performed by: OBSTETRICS & GYNECOLOGY

## 2020-11-12 PROCEDURE — 65410000002 HC RM PRIVATE OB

## 2020-11-12 PROCEDURE — 86923 COMPATIBILITY TEST ELECTRIC: CPT

## 2020-11-12 PROCEDURE — 30233N1 TRANSFUSION OF NONAUTOLOGOUS RED BLOOD CELLS INTO PERIPHERAL VEIN, PERCUTANEOUS APPROACH: ICD-10-PCS | Performed by: OBSTETRICS & GYNECOLOGY

## 2020-11-12 PROCEDURE — P9016 RBC LEUKOCYTES REDUCED: HCPCS

## 2020-11-12 RX ORDER — SODIUM CHLORIDE 9 MG/ML
250 INJECTION, SOLUTION INTRAVENOUS AS NEEDED
Status: DISCONTINUED | OUTPATIENT
Start: 2020-11-12 | End: 2020-11-13 | Stop reason: HOSPADM

## 2020-11-12 RX ORDER — IBUPROFEN 800 MG/1
800 TABLET ORAL
Qty: 60 TAB | Refills: 0 | Status: SHIPPED | OUTPATIENT
Start: 2020-11-12 | End: 2021-02-03 | Stop reason: ALTCHOICE

## 2020-11-12 RX ADMIN — FERROUS SULFATE TAB 325 MG (65 MG ELEMENTAL FE) 325 MG: 325 (65 FE) TAB at 07:23

## 2020-11-12 RX ADMIN — FERROUS SULFATE TAB 325 MG (65 MG ELEMENTAL FE) 325 MG: 325 (65 FE) TAB at 12:48

## 2020-11-12 RX ADMIN — IBUPROFEN 800 MG: 800 TABLET, FILM COATED ORAL at 23:30

## 2020-11-12 RX ADMIN — OXYCODONE AND ACETAMINOPHEN 1 TABLET: 5; 325 TABLET ORAL at 07:23

## 2020-11-12 RX ADMIN — OXYCODONE AND ACETAMINOPHEN 1 TABLET: 5; 325 TABLET ORAL at 01:00

## 2020-11-12 RX ADMIN — OXYCODONE AND ACETAMINOPHEN 1 TABLET: 5; 325 TABLET ORAL at 17:05

## 2020-11-12 RX ADMIN — OXYCODONE AND ACETAMINOPHEN 1 TABLET: 5; 325 TABLET ORAL at 23:30

## 2020-11-12 RX ADMIN — FERROUS SULFATE TAB 325 MG (65 MG ELEMENTAL FE) 325 MG: 325 (65 FE) TAB at 17:05

## 2020-11-12 NOTE — PROGRESS NOTES
Post-Partum Day Number 2 Progress Note    [place]       Information for the patient's :  Harhs Martinez [[id number]]   Vaginal, Spontaneous       Patient doing well without significant complaint. Denies known h/o anemia outside of pregnancy. Bottle feeding. Light lochia. OOB and ambulating. Voiding without difficulty. Tolerating reg diet. R.O.S: denies dizziness, CP, or SOB    Vitals:  Visit Vitals  /74 (BP 1 Location: Right arm, BP Patient Position: At rest)   Pulse 81   Temp 98.7 °F (37.1 °C)   Resp 18   Ht 5' (1.524 m)   Wt 62.6 kg (138 lb)   LMP 2020 (Approximate)   SpO2 100%   Breastfeeding Unknown   BMI 26.95 kg/m²     Temp (24hrs), Av.5 °F (36.9 °C), Min:98.3 °F (36.8 °C), Max:98.7 °F (37.1 °C)        Exam:   Patient without distress. Abdomen soft, fundus firm 3 fingerbreaths above umbilicus, nontender                Normal resp effort                Lower extremities are negative for swelling, cords or tenderness.     Labs:     Lab Results   Component Value Date/Time    WBC 14.2 (H) 2020 04:53 AM    WBC 6.6 2020 09:55 PM    WBC 6.1 10/23/2020 09:14 AM    HGB 5.0 (LL) 2020 04:53 AM    HGB 5.4 (LL) 2020 01:50 PM    HGB 8.0 (L) 11/10/2020 12:04 PM    HGB 6.7 (L) 2020 09:55 PM    HGB 6.9 (LL) 10/23/2020 09:14 AM    HCT 17.3 (LL) 2020 04:53 AM    HCT 18.6 (L) 2020 01:50 PM    HCT 27.4 (L) 11/10/2020 12:04 PM    HCT 22.7 (L) 2020 09:55 PM    HCT 21.7 (L) 10/23/2020 09:14 AM    PLATELET 397  04:53 AM    PLATELET 777  09:55 PM    PLATELET 931  09:14 AM       Recent Results (from the past 24 hour(s))   HGB & HCT    Collection Time: 20  1:50 PM   Result Value Ref Range    HGB 5.4 (LL) 11.5 - 16.0 g/dL    HCT 18.6 (L) 35.0 - 47.0 %   CBC WITH AUTOMATED DIFF    Collection Time: 20  4:53 AM   Result Value Ref Range    WBC 14.2 (H) 3.6 - 11.0 K/uL    RBC 2.26 (L) 3.80 - 5.20 M/uL HGB 5.0 (LL) 11.5 - 16.0 g/dL    HCT 17.3 (LL) 35.0 - 47.0 %    MCV 76.5 (L) 80.0 - 99.0 FL    MCH 22.1 (L) 26.0 - 34.0 PG    MCHC 28.9 (L) 30.0 - 36.5 g/dL    RDW 19.5 (H) 11.5 - 14.5 %    PLATELET 789 128 - 916 K/uL    MPV 9.6 8.9 - 12.9 FL    NEUTROPHILS 76 (H) 32 - 75 %    LYMPHOCYTES 17 12 - 49 %    MONOCYTES 5 5 - 13 %    EOSINOPHILS 1 0 - 7 %    BASOPHILS 0 0 - 1 %    IMMATURE GRANULOCYTES 1 (H) 0.0 - 0.5 %    ABS. NEUTROPHILS 10.9 (H) 1.8 - 8.0 K/UL    ABS. LYMPHOCYTES 2.4 0.8 - 3.5 K/UL    ABS. MONOCYTES 0.7 0.0 - 1.0 K/UL    ABS. EOSINOPHILS 0.1 0.0 - 0.4 K/UL    ABS. BASOPHILS 0.0 0.0 - 0.1 K/UL    ABS. IMM. GRANS. 0.2 (H) 0.00 - 0.04 K/UL    DF AUTOMATED         Assessment:   VAVD Day 2  Acute on chronic anemia  PPH      Plan:  1. Acute on chronic anemia; PPH- 2u pRBCs ordered due to [address], hgb <7, and expecting vaginal bleeding from lochia while at home. 2. Discharge planning: d/c home post transfusions.

## 2020-11-12 NOTE — PROGRESS NOTES
Pt sleeping.  2220 voices no c/o.  2330 pt voices no c/o.  0100 percocet 1 po given for pain at the epidural site per pt requests. 0115 Pt informed me of urinating often in small amount, Informed to pt we will start measuring her urine and to let me know when she urinate. 0315 pt voided 150 ml of clear yellow urine, pt stated \" she does feel that shes emptying her bladder \". Will keep monitoring her urine output. 0510 pt sleeping.  0643 critical values : Hgb 5.0 , Hct 17.3 called to DR. Matta no new orders. 3017 Bedside report given to [facility].

## 2020-11-12 NOTE — DISCHARGE SUMMARY
Obstetrical Discharge Summary     Name: Moe Weaver MRN: [id number]  SSN: [id number]    YOB: 1990  Age: 34 y.o. Sex: female      Admit Date: 2020    Discharge Date: 2020     Admitting Physician: Atul Bullock MD     Attending Physician:  Yrn Laguna MD     Admission Diagnoses: Normal delivery [O80]    Discharge Diagnoses:   Information for the patient's :  Shayy Blankr [[id number]]   Delivery of a 3.13 kg female infant via Vaginal, Spontaneous on 11/10/2020 at 12:02 AM  by Tiara Eaton. Apgars were 8  and 9 . Additional Diagnoses:   Hospital Problems  Date Reviewed: 2020          Codes Class Noted POA    Other immediate postpartum hemorrhage ICD-10-CM: O72.1  ICD-9-CM: 666.14  2020 No        Acute on chronic anemia ICD-10-CM: D64.9  ICD-9-CM: 285.9  2020 Clinically Undetermined        Vacuum-assisted vaginal delivery ICD-10-CM: Z37.9  ICD-9-CM: 669.51  2020 No        Normal delivery ICD-10-CM: O80  ICD-9-CM: 528  2020 No             Lab Results   Component Value Date/Time    Rubella, External immune 10/20/2020       Hospital Course: Patient presented on 2020 for cervical ripening via cervidil and plan for IOL in am. On 11/10/2020, she delivered via VAVD at 40+wga. Delivery complicated by postpartum hemorrhage. She presented with an H/H of 6.7/22. 7. Postpartum, her hemoglobin and hematocrit gradually decreased to 5.0/17. 3. Patient was asymptomatic on postpartum day 2 but due to hgb less than 7, postpartum hemorrhage, and expected continued vaginal bleeding from lochia, decision made to proceed with blood transfusion. She received 2 units pRBCs. Her post transfusion H/H was 8.6/28.3. She was deemed as safe for discharge to home.        Disposition: Home  Condition: Good    Patient Instructions:   Current Discharge Medication List      START taking these medications    Details   ibuprofen (MOTRIN) 800 mg tablet Take 1 Tab by mouth every eight (8) hours as needed for Pain. Qty: 60 Tab, Refills: 0    Associated Diagnoses: Vacuum-assisted vaginal delivery         CONTINUE these medications which have NOT CHANGED    Details   docusate sodium (COLACE) 100 mg capsule Take 1 Cap by mouth two (2) times a day for 180 days. Qty: 60 Cap, Refills: 5    Associated Diagnoses: Anemia of pregnancy in third trimester      ferrous sulfate 325 mg (65 mg iron) tablet Take 1 Tab by mouth three (3) times daily (with meals). Qty: 90 Tab, Refills: 5    Associated Diagnoses: Anemia of pregnancy in third trimester             Reference my discharge instructions.     Follow-up Appointments   Procedures    FOLLOW UP VISIT Appointment in: 6 Weeks PP visit     PP visit     Standing Status:   Standing     Number of Occurrences:   1     Order Specific Question:   Appointment in     Answer:   6 Weeks        Signed By:  Jase Ponce MD     November 12, 2020

## 2020-11-12 NOTE — PROGRESS NOTES
0645:  Received AM shift report from 1200 [address] at the bedside. 0830:  orders received for patient to get 2 units of RBCs stat. Also, type and screen ordered stat. 1035:  Called lab regarding stat type and screen. Was told someone would be up shortly. 1100:  Assisted patient with sitz bath. Patient said she feels better after it.    0484 31 29 02:  Blood transfusion started. Verified with Tammy Vega. Consent on chart. Will remain with patient x 15 minutes. 1240:  No s/s of reaction. Vitals stable- see flowsheet. 1510:  1st unit of blood complete. When attempting to flush IV with NS, IV started to leak. D/C'd IV at this time. New IV started by Tammy Vega. See avatar. 1615: 2nd unit of blood started. Will remain with patient x 15 mins. 1630:  No s/s of reaction. 1910:  Gave bedside shift report to 1200 [address].

## 2020-11-13 LAB
ABO + RH BLD: NORMAL
BLD PROD TYP BPU: NORMAL
BLD PROD TYP BPU: NORMAL
BLOOD GROUP ANTIBODIES SERPL: NEGATIVE
BPU ID: NORMAL
BPU ID: NORMAL
CROSSMATCH RESULT,%XM: NORMAL
CROSSMATCH RESULT,%XM: NORMAL
SPECIMEN EXP DATE BLD: NORMAL
STATUS OF UNIT,%ST: NORMAL
STATUS OF UNIT,%ST: NORMAL
TRANSFUSION STATUS PATIENT QL: NORMAL
TRANSFUSION STATUS PATIENT QL: NORMAL
UNIT DIVISION, %UDIV: 0
UNIT DIVISION, %UDIV: 0

## 2020-11-13 NOTE — PROGRESS NOTES
Blood transfusion completed, no signs of blood transfusion reaction. Called lab regarding her CBC for 2000 not done , informed to me there is only one phlebotomist for the whole hospital and they were unable to call or page her. 2130 called Lab again regarding  CBC not not drawn was told the same as above note. 1 L/D nurse Louie Mcmahon RN in pt room to draw blood for CBC since  not present. 2300 lab results Hgb 8.6 called to Dr Krish Guerrero ,new orders taking. 2315 discharge instructions given ,verbalized understandings. 2343 Pt discharged home in stable conditions , accompanied by significant other.

## 2021-02-03 ENCOUNTER — OFFICE VISIT (OUTPATIENT)
Dept: OBGYN CLINIC | Age: 31
End: 2021-02-03
Payer: MEDICAID

## 2021-02-03 VITALS
WEIGHT: 115 LBS | BODY MASS INDEX: 22.58 KG/M2 | HEIGHT: 60 IN | SYSTOLIC BLOOD PRESSURE: 140 MMHG | DIASTOLIC BLOOD PRESSURE: 83 MMHG | HEART RATE: 89 BPM

## 2021-02-03 DIAGNOSIS — N94.89 SUPPRESSION OF MENSES: ICD-10-CM

## 2021-02-03 DIAGNOSIS — Z11.3 SCREENING FOR VENEREAL DISEASE: ICD-10-CM

## 2021-02-03 DIAGNOSIS — Z12.4 SCREENING FOR MALIGNANT NEOPLASM OF THE CERVIX: ICD-10-CM

## 2021-02-03 LAB
HCG URINE, QL. (POC): NEGATIVE
VALID INTERNAL CONTROL?: NO

## 2021-02-03 PROCEDURE — 0503F POSTPARTUM CARE VISIT: CPT | Performed by: OBSTETRICS & GYNECOLOGY

## 2021-02-03 PROCEDURE — 81025 URINE PREGNANCY TEST: CPT | Performed by: OBSTETRICS & GYNECOLOGY

## 2021-02-03 NOTE — PROGRESS NOTES
31 y.o.black female,  3 para 3003  last menstrual period 2021, now status post spontaneous vaginal delivery viable female infant 6 pounds 14 ounces November 10, 2020, history of spontaneous vaginal delivery 2019 viable female infant 6 lbs. 10 oz., History of vacuum-assisted delivery 2017, viable female infant 6 pounds 4.2 ounces at 39 weeks, who presents for postpartum check. Patient is without complaints. She denies nausea vomiting fever chills bleeding, contractions, leakage of fluid vaginal discharge itching odor or pelvic pain. She is sexually active with the same partner since 2011. Last sexual encounter was 1 week ago and she denies pain or bleeding with intercourse. patient is a nonsmoker  Patient currently works for [employer]    Last Pap smear 2020negative; STD screen was negative as well      History reviewed. No pertinent past medical history. History reviewed. No pertinent surgical history. Social History     Socioeconomic History    Marital status: SINGLE     Spouse name: Not on file    Number of children: Not on file    Years of education: Not on file    Highest education level: Not on file   Tobacco Use    Smoking status: Never Smoker    Smokeless tobacco: Never Used   Substance and Sexual Activity    Alcohol use: Never     Frequency: Never    Drug use: Never    Sexual activity: Yes   Other Topics Concern      History reviewed. No pertinent family history. No current outpatient medications on file prior to visit. No current facility-administered medications on file prior to visit.       Allergies as of 2021    (No Known Allergies)         Constitutional  Chaperone: accepted and present  General Appearance: healthy-appearing, well-nourished, well-developed (Petite black female)  Level of Distress: NAD  Ambulation: ambulating normally    Psychiatric  Insight: good judgement  Mental Status: active and alert, normal mood, normal affect  Orientation: to time, to place, to person  Memory: recent memory normal, remote memory normal    Head  Head: normocephalic, atraumatic    Neck  Neck: supple, trachea midline, no masses, FROM  Lymph Nodes: no cervical LAD, no supraclavicular LAD, no axillary LAD, no inguinal LAD  Thyroid: no enlargement, non-tender, no nodules    Lungs  Respiratory effort: no dyspnea  Auscultation: breath sounds normal, good air movement, CTA except as noted, no wheezing, no rales/crackles, no rhonchi    Cardiovascular  Heart Auscultation: RRR, normal S1, normal S2, no murmurs, no rubs, no gallops  Pulses including femoral / pedal: normal throughout    Breast  Breast: normal appearance, no masses, no abnormal secretions    Abdomen  Bowel Sounds: normal  Inspection and Palpation: soft, non-distended, no tenderness, no guarding, no rebound tenderness, no masses, no CVA tenderness (Soft, gravid, nontender; fundal height of 24)  Liver: non-tender, no hepatomegaly  Spleen: non-tender, no splenomegaly  Hernia: No hernia noted    Female   External genitalia: normal, no lesions, no rash  Vagina: moist mucosa (white/yellow discharge noted discharge)  Cervix: no discharge, no cervical motion tenderness, no inflammation, sample taken for Pap smear   Uterus: midline, smooth, non-tender; normal size  Adnexae: size WNL, no adnexal mass, no adnexal tenderness  Bladder and Urethra: normal bladder and urethra (except where noted)    Musculoskeletal:  Motor Strength and Tone: normal motor strength, normal tone  Joints, Bones, and Muscles: normal movement of all extremities, no bony abnormalities, no contractures, no malalignment, no tenderness  Extremities: no cyanosis, no edema, no varicosities, no palpable cord    Neurologic  Gait and Station: normal gait, normal station  Sensation: grossly intact  Reflexes: DTRs 2+ bilaterally throughout    Skin  Inspection and palpation: no rash, no lesions, no ulcer, no abnormal nevi, no induration, no nodules, good turgor, no jaundice  Nails: normal    Back  Thoracolumbar Appearance: normal curvature      ICD-10-CM ICD-9-CM    1. Routine postpartum follow-up  Z39.2 V24.2    2. Screening for malignant neoplasm of the cervix  Z12.4 V76.2 PAP IG, CT-NG-TV, APTIMA HPV AND RFX 65/27,96([id number])   3. Screening for venereal disease  Z11.3 V74.5 PAP IG, CT-NG-TV, APTIMA HPV AND RFX 34/67,78([id number])   4.  Suppression of menses  N94.89 626.8 AMB POC URINE PREGNANCY TEST, VISUAL COLOR COMPARISON     Doing well postpartum  Check Pap smear, STD screen  Follow-up for Mirena insertion  Otherwise follow-up in 1 year for annual

## 2021-02-09 LAB
C TRACH RRNA CVX QL NAA+PROBE: NEGATIVE
CYTOLOGIST CVX/VAG CYTO: NORMAL
CYTOLOGY CVX/VAG DOC CYTO: NORMAL
CYTOLOGY CVX/VAG DOC THIN PREP: NORMAL
DX ICD CODE: NORMAL
HPV I/H RISK 4 DNA CVX QL PROBE+SIG AMP: NEGATIVE
Lab: NORMAL
N GONORRHOEA RRNA CVX QL NAA+PROBE: NEGATIVE
OTHER STN SPEC: NORMAL
STAT OF ADQ CVX/VAG CYTO-IMP: NORMAL
T VAGINALIS RRNA SPEC QL NAA+PROBE: NEGATIVE

## 2021-02-25 ENCOUNTER — OFFICE VISIT (OUTPATIENT)
Dept: OBGYN CLINIC | Age: 31
End: 2021-02-25
Payer: MEDICAID

## 2021-02-25 VITALS
WEIGHT: 110.38 LBS | HEART RATE: 75 BPM | DIASTOLIC BLOOD PRESSURE: 79 MMHG | TEMPERATURE: 98.6 F | OXYGEN SATURATION: 99 % | HEIGHT: 60 IN | SYSTOLIC BLOOD PRESSURE: 126 MMHG | BODY MASS INDEX: 21.67 KG/M2

## 2021-02-25 DIAGNOSIS — N91.2 AMENORRHEA: ICD-10-CM

## 2021-02-25 DIAGNOSIS — N94.89 SUPPRESSION OF MENSTRUATION: Primary | ICD-10-CM

## 2021-02-25 LAB
HCG URINE, QL. (POC): NEGATIVE
VALID INTERNAL CONTROL?: NO

## 2021-02-25 PROCEDURE — 81025 URINE PREGNANCY TEST: CPT | Performed by: OBSTETRICS & GYNECOLOGY

## 2021-02-25 PROCEDURE — 58300 INSERT INTRAUTERINE DEVICE: CPT | Performed by: OBSTETRICS & GYNECOLOGY

## 2021-02-25 NOTE — PROGRESS NOTES
31 y.o.black female,  3 para 3003  last menstrual period 2021, now status post spontaneous vaginal delivery viable female infant 6 pounds 14 ounces November 10, 2020, history of spontaneous vaginal delivery 2019 viable female infant 6 lbs. 10 oz., History of vacuum-assisted delivery 2017, viable female infant 6 pounds 4.2 ounces at 39 weeks, who presents for IUD insertion. Patient is without complaints. She denies nausea vomiting fever chills bleeding, contractions, leakage of fluid vaginal discharge itching odor or pelvic pain. She is sexually active with the same partner since 2011. Last sexual encounter was 1 week ago and she denies pain or bleeding with intercourse. patient is a nonsmoker  Patient currently works for [employer]    Last Pap smear 2020negative; STD screen was negative as well      No past medical history on file. No past surgical history on file. Social History     Socioeconomic History    Marital status: SINGLE     Spouse name: Not on file    Number of children: Not on file    Years of education: Not on file    Highest education level: Not on file   Tobacco Use    Smoking status: Never Smoker    Smokeless tobacco: Never Used   Substance and Sexual Activity    Alcohol use: Never     Frequency: Never    Drug use: Never    Sexual activity: Yes   Other Topics Concern      No family history on file. No current outpatient medications on file prior to visit. No current facility-administered medications on file prior to visit.       Allergies as of 2021    (No Known Allergies)         Constitutional  Chaperone: accepted and present  General Appearance: healthy-appearing, well-nourished, well-developed (Petite black female)  Level of Distress: NAD  Ambulation: ambulating normally    Psychiatric  Insight: good judgement  Mental Status: active and alert, normal mood, normal affect  Orientation: to time, to place, to person  Memory: recent memory normal, remote memory normal    Head  Head: normocephalic, atraumatic          ICD-10-CM ICD-9-CM    1.  Amenorrhea  N91.2 626.0 AMB POC URINE PREGNANCY TEST, VISUAL COLOR COMPARISON     IUD inserted under ultrasound guidance without difficulty

## 2021-03-23 ENCOUNTER — HOSPITAL ENCOUNTER (EMERGENCY)
Age: 31
Discharge: HOME | End: 2021-03-23
Payer: MEDICAID

## 2021-03-23 ENCOUNTER — APPOINTMENT (OUTPATIENT)
Dept: GENERAL RADIOLOGY | Age: 31
End: 2021-03-23
Attending: NURSE PRACTITIONER
Payer: MEDICAID

## 2021-03-23 VITALS
RESPIRATION RATE: 16 BRPM | HEART RATE: 79 BPM | OXYGEN SATURATION: 100 % | BODY MASS INDEX: 20.62 KG/M2 | TEMPERATURE: 98.2 F | HEIGHT: 60 IN | SYSTOLIC BLOOD PRESSURE: 134 MMHG | WEIGHT: 105 LBS | DIASTOLIC BLOOD PRESSURE: 72 MMHG
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
## 2021-03-23 DIAGNOSIS — M62.830 MUSCLE SPASM OF BACK: ICD-10-CM

## 2021-03-23 DIAGNOSIS — S16.1XXA STRAIN OF NECK MUSCLE, INITIAL ENCOUNTER: Primary | ICD-10-CM

## 2021-03-23 PROCEDURE — 99282 EMERGENCY DEPT VISIT SF MDM: CPT

## 2021-03-23 PROCEDURE — 72050 X-RAY EXAM NECK SPINE 4/5VWS: CPT

## 2021-03-23 RX ORDER — HYDROCODONE BITARTRATE AND ACETAMINOPHEN 5; 325 MG/1; MG/1
1 TABLET ORAL
Qty: 12 TAB | Refills: 0 | Status: SHIPPED | OUTPATIENT
Start: 2021-03-23 | End: 2021-03-26

## 2021-03-23 RX ORDER — IBUPROFEN 400 MG/1
400 TABLET ORAL
Qty: 20 TAB | Refills: 0 | Status: SHIPPED | OUTPATIENT
Start: 2021-03-23

## 2021-03-23 RX ORDER — CYCLOBENZAPRINE HCL 10 MG
10 TABLET ORAL
Qty: 12 TAB | Refills: 0 | Status: SHIPPED | OUTPATIENT
Start: 2021-03-23

## 2021-03-23 NOTE — Clinical Note
[address] [place] [address] EMERGENCY DEPT 
 [address] [id number] 
[phone] Work/School Note Date: 3/23/2021 To Whom It May concern: 
 
Fely Lucero was seen and treated today in the emergency room by the following provider(s): 
Nurse Practitioner: Janie Clark NP. Fely Lucero is excused from work/school on 3/23/2021 through 3/26/2021. She is medically clear to return to work/school on 3/27/2021. Sincerely, Milagro Tripp NP
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
## 2022-03-18 PROBLEM — Z37.9 VACUUM-ASSISTED VAGINAL DELIVERY: Status: ACTIVE | Noted: 2020-11-12

## 2022-03-18 PROBLEM — D64.9 ACUTE ON CHRONIC ANEMIA: Status: ACTIVE | Noted: 2020-11-12

## 2022-07-31 ENCOUNTER — HOSPITAL ENCOUNTER (EMERGENCY)
Age: 32
Discharge: HOME | End: 2022-08-01
Attending: EMERGENCY MEDICINE
Payer: MEDICAID

## 2022-07-31 DIAGNOSIS — N39.0 URINARY TRACT INFECTION WITHOUT HEMATURIA, SITE UNSPECIFIED: Primary | ICD-10-CM

## 2022-07-31 LAB
ALBUMIN SERPL-MCNC: 3.9 G/DL (ref 3.5–5)
ALBUMIN/GLOB SERPL: 0.9 {RATIO} (ref 1.1–2.2)
ALP SERPL-CCNC: 83 U/L (ref 45–117)
ALT SERPL-CCNC: 21 U/L (ref 12–78)
ANION GAP SERPL CALC-SCNC: 1 MMOL/L (ref 5–15)
AST SERPL W P-5'-P-CCNC: ABNORMAL U/L (ref 15–37)
BASOPHILS # BLD: 0 K/UL (ref 0–0.1)
BASOPHILS NFR BLD: 0 % (ref 0–1)
BILIRUB SERPL-MCNC: 1.2 MG/DL (ref 0.2–1)
BUN SERPL-MCNC: 14 MG/DL (ref 6–20)
BUN/CREAT SERPL: 17 (ref 12–20)
CA-I BLD-MCNC: 9.1 MG/DL (ref 8.5–10.1)
CHLORIDE SERPL-SCNC: 105 MMOL/L (ref 97–108)
CO2 SERPL-SCNC: 28 MMOL/L (ref 21–32)
CREAT SERPL-MCNC: 0.82 MG/DL (ref 0.55–1.02)
DIFFERENTIAL METHOD BLD: ABNORMAL
EOSINOPHIL # BLD: 0 K/UL (ref 0–0.4)
EOSINOPHIL NFR BLD: 0 % (ref 0–7)
ERYTHROCYTE [DISTWIDTH] IN BLOOD BY AUTOMATED COUNT: 11.6 % (ref 11.5–14.5)
GLOBULIN SER CALC-MCNC: 4.3 G/DL (ref 2–4)
GLUCOSE SERPL-MCNC: 115 MG/DL (ref 65–100)
HCT VFR BLD AUTO: 39.2 % (ref 35–47)
HGB BLD-MCNC: 13.5 G/DL (ref 11.5–16)
IMM GRANULOCYTES # BLD AUTO: 0 K/UL (ref 0–0.04)
IMM GRANULOCYTES NFR BLD AUTO: 0 % (ref 0–0.5)
LIPASE SERPL-CCNC: 78 U/L (ref 73–393)
LYMPHOCYTES # BLD: 1.7 K/UL (ref 0.8–3.5)
LYMPHOCYTES NFR BLD: 18 % (ref 12–49)
MCH RBC QN AUTO: 30.1 PG (ref 26–34)
MCHC RBC AUTO-ENTMCNC: 34.4 G/DL (ref 30–36.5)
MCV RBC AUTO: 87.5 FL (ref 80–99)
MONOCYTES # BLD: 0.6 K/UL (ref 0–1)
MONOCYTES NFR BLD: 6 % (ref 5–13)
NEUTS SEG # BLD: 7.2 K/UL (ref 1.8–8)
NEUTS SEG NFR BLD: 76 % (ref 32–75)
NRBC # BLD: 0 K/UL (ref 0–0.01)
NRBC BLD-RTO: 0 PER 100 WBC
PLATELET # BLD AUTO: 332 K/UL (ref 150–400)
PMV BLD AUTO: 10.1 FL (ref 8.9–12.9)
POTASSIUM SERPL-SCNC: ABNORMAL MMOL/L (ref 3.5–5.1)
PROT SERPL-MCNC: 8.2 G/DL (ref 6.4–8.2)
RBC # BLD AUTO: 4.48 M/UL (ref 3.8–5.2)
SODIUM SERPL-SCNC: 134 MMOL/L (ref 136–145)
WBC # BLD AUTO: 9.6 K/UL (ref 3.6–11)

## 2022-07-31 PROCEDURE — 85025 COMPLETE CBC W/AUTO DIFF WBC: CPT

## 2022-07-31 PROCEDURE — 80053 COMPREHEN METABOLIC PANEL: CPT

## 2022-07-31 PROCEDURE — 74011250636 HC RX REV CODE- 250/636: Performed by: EMERGENCY MEDICINE

## 2022-07-31 PROCEDURE — 36415 COLL VENOUS BLD VENIPUNCTURE: CPT

## 2022-07-31 PROCEDURE — 74011250637 HC RX REV CODE- 250/637: Performed by: EMERGENCY MEDICINE

## 2022-07-31 PROCEDURE — 81025 URINE PREGNANCY TEST: CPT

## 2022-07-31 PROCEDURE — 81001 URINALYSIS AUTO W/SCOPE: CPT

## 2022-07-31 PROCEDURE — 99284 EMERGENCY DEPT VISIT MOD MDM: CPT

## 2022-07-31 PROCEDURE — 96361 HYDRATE IV INFUSION ADD-ON: CPT

## 2022-07-31 PROCEDURE — 96374 THER/PROPH/DIAG INJ IV PUSH: CPT

## 2022-07-31 PROCEDURE — 83690 ASSAY OF LIPASE: CPT

## 2022-07-31 RX ORDER — ACETAMINOPHEN 500 MG
1000 TABLET ORAL ONCE
Status: COMPLETED | OUTPATIENT
Start: 2022-07-31 | End: 2022-07-31

## 2022-07-31 RX ORDER — KETOROLAC TROMETHAMINE 15 MG/ML
15 INJECTION, SOLUTION INTRAMUSCULAR; INTRAVENOUS ONCE
Status: COMPLETED | OUTPATIENT
Start: 2022-07-31 | End: 2022-07-31

## 2022-07-31 RX ADMIN — ACETAMINOPHEN 1000 MG: 500 TABLET ORAL at 22:51

## 2022-07-31 RX ADMIN — KETOROLAC TROMETHAMINE 15 MG: 15 INJECTION, SOLUTION INTRAMUSCULAR; INTRAVENOUS at 22:51

## 2022-07-31 RX ADMIN — SODIUM CHLORIDE 1000 ML: 9 INJECTION, SOLUTION INTRAVENOUS at 22:51

## 2022-08-01 ENCOUNTER — APPOINTMENT (OUTPATIENT)
Dept: ULTRASOUND IMAGING | Age: 32
End: 2022-08-01
Attending: EMERGENCY MEDICINE
Payer: MEDICAID

## 2022-08-01 VITALS
OXYGEN SATURATION: 99 % | RESPIRATION RATE: 16 BRPM | WEIGHT: 105 LBS | HEART RATE: 77 BPM | TEMPERATURE: 99.1 F | BODY MASS INDEX: 20.62 KG/M2 | HEIGHT: 60 IN | DIASTOLIC BLOOD PRESSURE: 82 MMHG | SYSTOLIC BLOOD PRESSURE: 124 MMHG

## 2022-08-01 LAB
ALBUMIN SERPL-MCNC: 3.7 G/DL (ref 3.5–5)
ALBUMIN/GLOB SERPL: 1.1 {RATIO} (ref 1.1–2.2)
ALP SERPL-CCNC: 72 U/L (ref 45–117)
ALT SERPL-CCNC: 15 U/L (ref 12–78)
ANION GAP SERPL CALC-SCNC: 3 MMOL/L (ref 5–15)
APPEARANCE UR: ABNORMAL
AST SERPL W P-5'-P-CCNC: 24 U/L (ref 15–37)
BACTERIA URNS QL MICRO: NEGATIVE /HPF
BILIRUB SERPL-MCNC: 1.1 MG/DL (ref 0.2–1)
BILIRUB UR QL: NEGATIVE
BUN SERPL-MCNC: 13 MG/DL (ref 6–20)
BUN/CREAT SERPL: 20 (ref 12–20)
CA-I BLD-MCNC: 8.4 MG/DL (ref 8.5–10.1)
CHLORIDE SERPL-SCNC: 107 MMOL/L (ref 97–108)
CO2 SERPL-SCNC: 26 MMOL/L (ref 21–32)
COLOR UR: YELLOW
CREAT SERPL-MCNC: 0.66 MG/DL (ref 0.55–1.02)
EPITH CASTS URNS QL MICRO: ABNORMAL /LPF
GLOBULIN SER CALC-MCNC: 3.3 G/DL (ref 2–4)
GLUCOSE SERPL-MCNC: 101 MG/DL (ref 65–100)
GLUCOSE UR STRIP.AUTO-MCNC: NEGATIVE MG/DL
HCG UR QL: NEGATIVE
HGB UR QL STRIP: 250
KETONES UR QL STRIP.AUTO: NEGATIVE MG/DL
LEUKOCYTE ESTERASE UR QL STRIP.AUTO: 500
NITRITE UR QL STRIP.AUTO: NEGATIVE
PH UR STRIP: 6.5 [PH] (ref 5–8)
POTASSIUM SERPL-SCNC: 4.3 MMOL/L (ref 3.5–5.1)
PROT SERPL-MCNC: 7 G/DL (ref 6.4–8.2)
PROT UR STRIP-MCNC: 30 MG/DL
RBC #/AREA URNS HPF: ABNORMAL /HPF (ref 0–5)
SODIUM SERPL-SCNC: 136 MMOL/L (ref 136–145)
SP GR UR REFRACTOMETRY: 1 (ref 1–1.03)
UA: UC IF INDICATED,UAUC: ABNORMAL
UROBILINOGEN UR QL STRIP.AUTO: NORMAL EU/DL (ref 0.1–1)
WBC URNS QL MICRO: ABNORMAL /HPF (ref 0–4)

## 2022-08-01 PROCEDURE — 96375 TX/PRO/DX INJ NEW DRUG ADDON: CPT

## 2022-08-01 PROCEDURE — 36415 COLL VENOUS BLD VENIPUNCTURE: CPT

## 2022-08-01 PROCEDURE — 96361 HYDRATE IV INFUSION ADD-ON: CPT

## 2022-08-01 PROCEDURE — 76705 ECHO EXAM OF ABDOMEN: CPT

## 2022-08-01 PROCEDURE — 74011000250 HC RX REV CODE- 250: Performed by: EMERGENCY MEDICINE

## 2022-08-01 PROCEDURE — 80053 COMPREHEN METABOLIC PANEL: CPT

## 2022-08-01 PROCEDURE — 74011250636 HC RX REV CODE- 250/636: Performed by: EMERGENCY MEDICINE

## 2022-08-01 RX ORDER — CEPHALEXIN 500 MG/1
500 CAPSULE ORAL 2 TIMES DAILY
Qty: 14 CAPSULE | Refills: 0 | Status: SHIPPED | OUTPATIENT
Start: 2022-08-01 | End: 2022-08-08

## 2022-08-01 RX ADMIN — CEFTRIAXONE SODIUM 1 G: 1 INJECTION, POWDER, FOR SOLUTION INTRAMUSCULAR; INTRAVENOUS at 02:59

## 2022-08-01 RX ADMIN — SODIUM CHLORIDE, PRESERVATIVE FREE 20 MG: 5 INJECTION INTRAVENOUS at 00:57

## 2022-08-01 NOTE — ED TRIAGE NOTES
Pt been having achy right flank pain x2 days. States this has never happened before. Denies any medical history, no disruption to urinary output.  States it's worse when she stands up and that the pain comes and goes

## 2022-08-01 NOTE — ED PROVIDER NOTES
EMERGENCY DEPARTMENT HISTORY AND PHYSICAL EXAM      Date: 7/31/2022  Patient Name: Geraldine Santiago    History of Presenting Illness     Chief Complaint   Patient presents with    Abdominal Pain       History Provided By: Patient    HPI: Geraldine Santiago, 36 y.o. female with no medical problems who presents with right upper quadrant abdominal pain. States that symptoms have been present for 2 days, intermittent, 9/10, and radiating to the right side. Pain is not made worse by anything. Has been taking Tylenol. Denies any fevers, nausea, vomiting, dysuria, or other associated symptoms. There are no other complaints, changes, or physical findings at this time. PCP: Keshawn Vick MD    Current Outpatient Medications   Medication Sig Dispense Refill    cephALEXin (Keflex) 500 mg capsule Take 1 Capsule by mouth two (2) times a day for 7 days. 14 Capsule 0    cyclobenzaprine (FLEXERIL) 10 mg tablet Take 1 Tab by mouth three (3) times daily as needed for Muscle Spasm(s). 12 Tab 0    ibuprofen (MOTRIN) 400 mg tablet Take 1 Tab by mouth every six (6) hours as needed for Pain. 20 Tab 0       Past History   Past Medical History:  No medical problems    Past Surgical History:  No surgical history    Family History:  No family history on file. Social History:  Social History     Tobacco Use    Smoking status: Never    Smokeless tobacco: Never   Substance Use Topics    Alcohol use: Not Currently    Drug use: Never       Allergies:  No Known Allergies     Review of Systems   Review of Systems   Constitutional:  Negative for fever. HENT:  Negative for congestion. Eyes:  Negative for visual disturbance. Respiratory:  Negative for shortness of breath. Cardiovascular:  Negative for chest pain. Gastrointestinal:  Positive for abdominal pain. Genitourinary:  Negative for dysuria. Musculoskeletal:  Negative for arthralgias. Skin:  Negative for rash. Neurological:  Negative for headaches.       Physical Exam Constitutional: No acute distress. Well-nourished. Skin: No rash. ENT: No rhinorrhea. No cough. Head is normocephalic and atraumatic. Eye: No proptosis or conjunctival injections. Respiratory: No apparent respiratory distress. Gastrointestinal: Nondistended. Mild tenderness to the epigastric and right upper quadrant. No rebound or guarding. Negative Powers sign. Negative Rovsing sign or McBurney point. : No CVA tenderness. No suprapubic tenderness. Musculoskeletal: No obvious bony deformities. Psychiatric: Cooperative. Appropriate mood and affect. Lab and Diagnostic Study Results   Labs -     Recent Results (from the past 12 hour(s))   CBC WITH AUTOMATED DIFF    Collection Time: 07/31/22 10:26 PM   Result Value Ref Range    WBC 9.6 3.6 - 11.0 K/uL    RBC 4.48 3.80 - 5.20 M/uL    HGB 13.5 11.5 - 16.0 g/dL    HCT 39.2 35.0 - 47.0 %    MCV 87.5 80.0 - 99.0 FL    MCH 30.1 26.0 - 34.0 PG    MCHC 34.4 30.0 - 36.5 g/dL    RDW 11.6 11.5 - 14.5 %    PLATELET 237 532 - 726 K/uL    MPV 10.1 8.9 - 12.9 FL    NRBC 0.0 0.0  WBC    ABSOLUTE NRBC 0.00 0.00 - 0.01 K/uL    NEUTROPHILS 76 (H) 32 - 75 %    LYMPHOCYTES 18 12 - 49 %    MONOCYTES 6 5 - 13 %    EOSINOPHILS 0 0 - 7 %    BASOPHILS 0 0 - 1 %    IMMATURE GRANULOCYTES 0 0 - 0.5 %    ABS. NEUTROPHILS 7.2 1.8 - 8.0 K/UL    ABS. LYMPHOCYTES 1.7 0.8 - 3.5 K/UL    ABS. MONOCYTES 0.6 0.0 - 1.0 K/UL    ABS. EOSINOPHILS 0.0 0.0 - 0.4 K/UL    ABS. BASOPHILS 0.0 0.0 - 0.1 K/UL    ABS. IMM.  GRANS. 0.0 0.00 - 0.04 K/UL    DF AUTOMATED     METABOLIC PANEL, COMPREHENSIVE    Collection Time: 07/31/22 10:26 PM   Result Value Ref Range    Sodium 134 (L) 136 - 145 mmol/L    Potassium Hemolyzed, recollect requested 3.5 - 5.1 mmol/L    Chloride 105 97 - 108 mmol/L    CO2 28 21 - 32 mmol/L    Anion gap 1 (L) 5 - 15 mmol/L    Glucose 115 (H) 65 - 100 mg/dL    BUN 14 6 - 20 mg/dL    Creatinine 0.82 0.55 - 1.02 mg/dL    BUN/Creatinine ratio 17 12 - 20      GFR est AA >60 >60 ml/min/1.73m2    GFR est non-AA >60 >60 ml/min/1.73m2    Calcium 9.1 8.5 - 10.1 mg/dL    Bilirubin, total 1.2 (H) 0.2 - 1.0 mg/dL    AST (SGOT) Hemolyzed, recollect requested 15 - 37 U/L    ALT (SGPT) 21 12 - 78 U/L    Alk. phosphatase 83 45 - 117 U/L    Protein, total 8.2 6.4 - 8.2 g/dL    Albumin 3.9 3.5 - 5.0 g/dL    Globulin 4.3 (H) 2.0 - 4.0 g/dL    A-G Ratio 0.9 (L) 1.1 - 2.2     LIPASE    Collection Time: 07/31/22 10:26 PM   Result Value Ref Range    Lipase 78 73 - 393 U/L   URINALYSIS W/ REFLEX CULTURE    Collection Time: 07/31/22 11:40 PM    Specimen: Urine   Result Value Ref Range    Color Yellow      Appearance Hazy (A) Clear      Specific gravity 1.005 1.003 - 1.030      pH (UA) 6.5 5.0 - 8.0      Protein 30 (A) Negative mg/dL    Glucose Negative Negative mg/dL    Ketone Negative Negative mg/dL    Bilirubin Negative Negative      Blood 250 (A) Negative      Urobilinogen Normal 0.1 - 1.0 EU/dL    Nitrites Negative Negative      Leukocyte Esterase 500 (A) Negative      WBC  0 - 4 /hpf    RBC 0-5 0 - 5 /hpf    Epithelial cells Moderate (A) Few /lpf    Bacteria Negative Negative /hpf    UA:UC IF INDICATED Urine Culture Ordered (A) Culture not indicated by UA result     HCG URINE, QL    Collection Time: 07/31/22 11:40 PM   Result Value Ref Range    HCG urine, QL Negative Negative     METABOLIC PANEL, COMPREHENSIVE    Collection Time: 08/01/22 12:46 AM   Result Value Ref Range    Sodium 136 136 - 145 mmol/L    Potassium 4.3 3.5 - 5.1 mmol/L    Chloride 107 97 - 108 mmol/L    CO2 26 21 - 32 mmol/L    Anion gap 3 (L) 5 - 15 mmol/L    Glucose 101 (H) 65 - 100 mg/dL    BUN 13 6 - 20 mg/dL    Creatinine 0.66 0.55 - 1.02 mg/dL    BUN/Creatinine ratio 20 12 - 20      GFR est AA >60 >60 ml/min/1.73m2    GFR est non-AA >60 >60 ml/min/1.73m2    Calcium 8.4 (L) 8.5 - 10.1 mg/dL    Bilirubin, total 1.1 (H) 0.2 - 1.0 mg/dL    AST (SGOT) 24 15 - 37 U/L    ALT (SGPT) 15 12 - 78 U/L    Alk.  phosphatase 72 45 - 117 U/L    Protein, total 7.0 6.4 - 8.2 g/dL    Albumin 3.7 3.5 - 5.0 g/dL    Globulin 3.3 2.0 - 4.0 g/dL    A-G Ratio 1.1 1.1 - 2.2         Radiologic Studies -   [unfilled]  CT Results  (Last 48 hours)      None          CXR Results  (Last 48 hours)      None            Medical Decision Making and ED Course   - I am the first and primary provider for this patient AND AM THE PRIMARY PROVIDER OF RECORD. I reviewed the vital signs, available nursing notes, past medical history, past surgical history, family history and social history. - Initial assessment performed. The patients presenting problems have been discussed, and the staff are in agreement with the care plan formulated and outlined with them. I have encouraged them to ask questions as they arise throughout their visit. Vital Signs-Reviewed the patient's vital signs. Patient Vitals for the past 12 hrs:   Temp Pulse Resp BP SpO2   08/01/22 0059 -- 68 16 121/73 100 %   07/31/22 2338 -- 74 16 120/80 98 %   07/31/22 2255 -- 78 16 122/84 100 %   07/31/22 2212 99 °F (37.2 °C) (!) 113 17 125/69 100 %     MDM  The differential diagnosis is gastritis, gastroparesis, acute cholecystitis, pancreatitis, UTI. Patient does have a urinary tract infection. Remainder of work-up is unremarkable including right upper quadrant ultrasound. Patient was given Tylenol, 1 g of Rocephin, Pepcid, Toradol, and IV fluids. Discharged in good condition with return precautions. Disposition     Disposition: Discharged    DISCHARGE PLAN:  1. Current Discharge Medication List        CONTINUE these medications which have NOT CHANGED    Details   cyclobenzaprine (FLEXERIL) 10 mg tablet Take 1 Tab by mouth three (3) times daily as needed for Muscle Spasm(s). Qty: 12 Tab, Refills: 0      ibuprofen (MOTRIN) 400 mg tablet Take 1 Tab by mouth every six (6) hours as needed for Pain. Qty: 20 Tab, Refills: 0           2.    Follow-up Information       Follow up With Specialties Details Why Contact Info    [address] [facility] EMERGENCY DEPT Emergency Medicine Go today As soon as possible if symptoms worsen [address] [address]  [phone]    Primary care doctor  Schedule an appointment as soon as possible for a visit in 3 days            3. Return to ED if worse   4. Current Discharge Medication List        START taking these medications    Details   cephALEXin (Keflex) 500 mg capsule Take 1 Capsule by mouth two (2) times a day for 7 days. Qty: 14 Capsule, Refills: 0  Start date: 8/1/2022, End date: 8/8/2022              Diagnosis/Clinical Impression   Clinical Impression:   1. Urinary tract infection without hematuria, site unspecified       Attestations:  Adriane Lott, DO    Please note that this dictation was completed with [employer], the computer voice recognition software. Quite often unanticipated grammatical, syntax, homophones, and other interpretive errors are inadvertently transcribed by the computer software. Please disregard these errors. Please excuse any errors that have escaped final proofreading. Thank you.

## 2022-08-01 NOTE — DISCHARGE INSTRUCTIONS
Thank you! Thank you for allowing me to care for you in the emergency department. It is my goal to provide you with excellent care. If you have not received excellent quality care, please ask to speak to the nurse manager. Please fill out the survey that will come to you by mail or email since we listen to your feedback! Below you will find a list of your tests from today's visit. Should you have any questions, please do not hesitate to call the emergency department. Labs  Recent Results (from the past 12 hour(s))   CBC WITH AUTOMATED DIFF    Collection Time: 07/31/22 10:26 PM   Result Value Ref Range    WBC 9.6 3.6 - 11.0 K/uL    RBC 4.48 3.80 - 5.20 M/uL    HGB 13.5 11.5 - 16.0 g/dL    HCT 39.2 35.0 - 47.0 %    MCV 87.5 80.0 - 99.0 FL    MCH 30.1 26.0 - 34.0 PG    MCHC 34.4 30.0 - 36.5 g/dL    RDW 11.6 11.5 - 14.5 %    PLATELET 738 411 - 238 K/uL    MPV 10.1 8.9 - 12.9 FL    NRBC 0.0 0.0  WBC    ABSOLUTE NRBC 0.00 0.00 - 0.01 K/uL    NEUTROPHILS 76 (H) 32 - 75 %    LYMPHOCYTES 18 12 - 49 %    MONOCYTES 6 5 - 13 %    EOSINOPHILS 0 0 - 7 %    BASOPHILS 0 0 - 1 %    IMMATURE GRANULOCYTES 0 0 - 0.5 %    ABS. NEUTROPHILS 7.2 1.8 - 8.0 K/UL    ABS. LYMPHOCYTES 1.7 0.8 - 3.5 K/UL    ABS. MONOCYTES 0.6 0.0 - 1.0 K/UL    ABS. EOSINOPHILS 0.0 0.0 - 0.4 K/UL    ABS. BASOPHILS 0.0 0.0 - 0.1 K/UL    ABS. IMM.  GRANS. 0.0 0.00 - 0.04 K/UL    DF AUTOMATED     METABOLIC PANEL, COMPREHENSIVE    Collection Time: 07/31/22 10:26 PM   Result Value Ref Range    Sodium 134 (L) 136 - 145 mmol/L    Potassium Hemolyzed, recollect requested 3.5 - 5.1 mmol/L    Chloride 105 97 - 108 mmol/L    CO2 28 21 - 32 mmol/L    Anion gap 1 (L) 5 - 15 mmol/L    Glucose 115 (H) 65 - 100 mg/dL    BUN 14 6 - 20 mg/dL    Creatinine 0.82 0.55 - 1.02 mg/dL    BUN/Creatinine ratio 17 12 - 20      GFR est AA >60 >60 ml/min/1.73m2    GFR est non-AA >60 >60 ml/min/1.73m2    Calcium 9.1 8.5 - 10.1 mg/dL    Bilirubin, total 1.2 (H) 0.2 - 1.0 mg/dL AST (SGOT) Hemolyzed, recollect requested 15 - 37 U/L    ALT (SGPT) 21 12 - 78 U/L    Alk. phosphatase 83 45 - 117 U/L    Protein, total 8.2 6.4 - 8.2 g/dL    Albumin 3.9 3.5 - 5.0 g/dL    Globulin 4.3 (H) 2.0 - 4.0 g/dL    A-G Ratio 0.9 (L) 1.1 - 2.2     LIPASE    Collection Time: 07/31/22 10:26 PM   Result Value Ref Range    Lipase 78 73 - 393 U/L   URINALYSIS W/ REFLEX CULTURE    Collection Time: 07/31/22 11:40 PM    Specimen: Urine   Result Value Ref Range    Color Yellow      Appearance Hazy (A) Clear      Specific gravity 1.005 1.003 - 1.030      pH (UA) 6.5 5.0 - 8.0      Protein 30 (A) Negative mg/dL    Glucose Negative Negative mg/dL    Ketone Negative Negative mg/dL    Bilirubin Negative Negative      Blood 250 (A) Negative      Urobilinogen Normal 0.1 - 1.0 EU/dL    Nitrites Negative Negative      Leukocyte Esterase 500 (A) Negative      WBC  0 - 4 /hpf    RBC 0-5 0 - 5 /hpf    Epithelial cells Moderate (A) Few /lpf    Bacteria Negative Negative /hpf    UA:UC IF INDICATED Urine Culture Ordered (A) Culture not indicated by UA result     HCG URINE, QL    Collection Time: 07/31/22 11:40 PM   Result Value Ref Range    HCG urine, QL Negative Negative     METABOLIC PANEL, COMPREHENSIVE    Collection Time: 08/01/22 12:46 AM   Result Value Ref Range    Sodium 136 136 - 145 mmol/L    Potassium 4.3 3.5 - 5.1 mmol/L    Chloride 107 97 - 108 mmol/L    CO2 26 21 - 32 mmol/L    Anion gap 3 (L) 5 - 15 mmol/L    Glucose 101 (H) 65 - 100 mg/dL    BUN 13 6 - 20 mg/dL    Creatinine 0.66 0.55 - 1.02 mg/dL    BUN/Creatinine ratio 20 12 - 20      GFR est AA >60 >60 ml/min/1.73m2    GFR est non-AA >60 >60 ml/min/1.73m2    Calcium 8.4 (L) 8.5 - 10.1 mg/dL    Bilirubin, total 1.1 (H) 0.2 - 1.0 mg/dL    AST (SGOT) 24 15 - 37 U/L    ALT (SGPT) 15 12 - 78 U/L    Alk.  phosphatase 72 45 - 117 U/L    Protein, total 7.0 6.4 - 8.2 g/dL    Albumin 3.7 3.5 - 5.0 g/dL    Globulin 3.3 2.0 - 4.0 g/dL    A-G Ratio 1.1 1.1 - 2.2 Radiologic Studies  US GALLBLADDER   Final Result   No acute process identified. CT Results  (Last 48 hours)      None          CXR Results  (Last 48 hours)      None          ------------------------------------------------------------------------------------------------------------  The exam and treatment you received in the Emergency Department were for an urgent problem and are not intended as complete care. It is important that you follow-up with a doctor, nurse practitioner, or physician assistant to:  (1) confirm your diagnosis,  (2) re-evaluation of changes in your illness and treatment, and  (3) for ongoing care. Please take your discharge instructions with you when you go to your follow-up appointment. If you have any problem arranging a follow-up appointment, contact the Emergency Department. If your symptoms become worse or you do not improve as expected and you are unable to reach your health care provider, please return to the Emergency Department. We are available 24 hours a day. If a prescription has been provided, please have it filled as soon as possible to prevent a delay in treatment. If you have any questions or reservations about taking the medication due to side effects or interactions with other medications, please call your primary care provider or contact the ER.

## 2023-05-20 RX ORDER — IBUPROFEN 400 MG/1
400 TABLET ORAL EVERY 6 HOURS PRN
COMMUNITY
Start: 2021-03-23

## 2023-05-20 RX ORDER — CYCLOBENZAPRINE HCL 10 MG
10 TABLET ORAL 3 TIMES DAILY PRN
COMMUNITY
Start: 2021-03-23

## 2024-08-19 ENCOUNTER — HOSPITAL ENCOUNTER (EMERGENCY)
Facility: HOSPITAL | Age: 34
Discharge: HOME | End: 2024-08-19
Payer: MEDICAID

## 2024-08-19 VITALS
HEIGHT: 60 IN | OXYGEN SATURATION: 97 % | BODY MASS INDEX: 21.6 KG/M2 | RESPIRATION RATE: 16 BRPM | TEMPERATURE: 98.7 F | SYSTOLIC BLOOD PRESSURE: 109 MMHG | WEIGHT: 110 LBS | HEART RATE: 93 BPM | DIASTOLIC BLOOD PRESSURE: 68 MMHG

## 2024-08-19 DIAGNOSIS — J06.9 VIRAL URI WITH COUGH: Primary | ICD-10-CM

## 2024-08-19 LAB
SARS-COV-2 RNA RESP QL NAA+PROBE: DETECTED
SPECIMEN SOURCE: ABNORMAL

## 2024-08-19 PROCEDURE — 87635 SARS-COV-2 COVID-19 AMP PRB: CPT

## 2024-08-19 PROCEDURE — 99283 EMERGENCY DEPT VISIT LOW MDM: CPT

## 2024-08-19 RX ORDER — ACETAMINOPHEN 500 MG
1000 TABLET ORAL 3 TIMES DAILY PRN
Qty: 30 TABLET | Refills: 0 | Status: SHIPPED | OUTPATIENT
Start: 2024-08-19 | End: 2024-08-29

## 2024-08-19 RX ORDER — IBUPROFEN 600 MG/1
600 TABLET ORAL 3 TIMES DAILY PRN
Qty: 30 TABLET | Refills: 0 | Status: SHIPPED | OUTPATIENT
Start: 2024-08-19

## 2024-08-19 ASSESSMENT — LIFESTYLE VARIABLES
HOW OFTEN DO YOU HAVE A DRINK CONTAINING ALCOHOL: NEVER
HOW MANY STANDARD DRINKS CONTAINING ALCOHOL DO YOU HAVE ON A TYPICAL DAY: PATIENT DOES NOT DRINK

## 2024-08-19 ASSESSMENT — PAIN SCALES - GENERAL: PAINLEVEL_OUTOF10: 8

## 2024-08-19 ASSESSMENT — PAIN - FUNCTIONAL ASSESSMENT: PAIN_FUNCTIONAL_ASSESSMENT: 0-10

## 2024-08-19 NOTE — DISCHARGE INSTRUCTIONS
Thank you for choosing our Emergency Department for your care.  It is our privilege to care for you in your time of need.  In the next several days, you may receive a survey via email or mailed to your home about your experience with our team.  We would greatly appreciate you taking a few minutes to complete the survey, as we use this information to learn what we have done well and what we could be doing better. Thank you for trusting us with your care!    Below you will find a list of your tests from today's visit.   Labs  No results found for this or any previous visit (from the past 12 hour(s)).    Radiologic Studies  No orders to display     ------------------------------------------------------------------------------------------------------------  The evaluation and treatment you received in the Emergency Department were for an urgent problem. It is important that you follow-up with a doctor, nurse practitioner, or physician assistant to:  (1) confirm your diagnosis,  (2) re-evaluation of changes in your illness and treatment, and (3) for ongoing care. Please take your discharge instructions with you when you go to your follow-up appointment.     If you have any problem arranging a follow-up appointment, contact us!  If your symptoms become worse or you do not improve as expected, please return to us. We are available 24 hours a day.     If a prescription has been provided, please fill it as soon as possible to prevent a delay in treatment. If you have any questions or reservations about taking the medication due to side effects or interactions with other medications, please call your primary care provider or contact us directly.  Again, THANK YOU for choosing us to care for YOU!     Lian from [facility] called about pt's back wound. States not getting better on medihoney. Suggested Min and they will continue to monitor.

## 2024-08-19 NOTE — ED PROVIDER NOTES
[facility] EMERGENCY DEPT  EMERGENCY DEPARTMENT HISTORY AND PHYSICAL EXAM      Date: 8/19/2024  Patient Name: Adwoa Manjarrez  MRN: [id number]  YOB: 1990  Date of evaluation: 8/19/2024  Provider: Jaiden العلي PA-C   Note Started: 1:22 PM EDT 8/19/24    HISTORY OF PRESENT ILLNESS     Chief Complaint   Patient presents with    Headache       History Provided By: Patient    HPI: Adwoa Manjarrez is a 33 y.o. female with no relevant past medical history presents emergency department complaining of headache, coughing, sneezing for 2 days.  Patient reports that he had a family visit her home recently and a few days after feeling of her head visited she began experiencing symptoms as listed above.  Patient denies any chest pain, shortness of breath, nausea, vomiting, abdominal pain, change in bowel or bladder habits.    PAST MEDICAL HISTORY   Past Medical History:  History reviewed. No pertinent past medical history.    Past Surgical History:  History reviewed. No pertinent surgical history.    Family History:  History reviewed. No pertinent family history.    Social History:  Social History     Tobacco Use    Smoking status: Never    Smokeless tobacco: Never   Substance Use Topics    Alcohol use: Not Currently    Drug use: Never       Allergies:  No Known Allergies    PCP: Felix Abrams MD    Current Meds:   No current facility-administered medications for this encounter.     Current Outpatient Medications   Medication Sig Dispense Refill    acetaminophen (TYLENOL) 500 MG tablet Take 2 tablets by mouth 3 times daily as needed for Pain 30 tablet 0    ibuprofen (ADVIL;MOTRIN) 600 MG tablet Take 1 tablet by mouth 3 times daily as needed for Pain 30 tablet 0    cyclobenzaprine (FLEXERIL) 10 MG tablet Take 1 tablet by mouth 3 times daily as needed         Social Determinants of Health:   Social Determinants of Health     Tobacco Use: Low Risk  (8/19/2024)    Patient History     Smoking Tobacco Use: Never     Smokeless

## 2025-08-28 ENCOUNTER — HOSPITAL ENCOUNTER (EMERGENCY)
Facility: HOSPITAL | Age: 35
Discharge: HOME | End: 2025-08-28
Payer: MEDICAID

## 2025-08-28 VITALS
SYSTOLIC BLOOD PRESSURE: 114 MMHG | DIASTOLIC BLOOD PRESSURE: 58 MMHG | TEMPERATURE: 99 F | HEIGHT: 60 IN | WEIGHT: 105 LBS | RESPIRATION RATE: 16 BRPM | HEART RATE: 95 BPM | BODY MASS INDEX: 20.62 KG/M2 | OXYGEN SATURATION: 100 %

## 2025-08-28 DIAGNOSIS — J06.9 VIRAL URI: Primary | ICD-10-CM

## 2025-08-28 LAB
FLUAV RNA SPEC QL NAA+PROBE: NOT DETECTED
FLUBV RNA SPEC QL NAA+PROBE: NOT DETECTED
SARS-COV-2 RNA RESP QL NAA+PROBE: NOT DETECTED

## 2025-08-28 PROCEDURE — 99283 EMERGENCY DEPT VISIT LOW MDM: CPT

## 2025-08-28 PROCEDURE — 87636 SARSCOV2 & INF A&B AMP PRB: CPT

## 2025-08-28 RX ORDER — ACETAMINOPHEN 500 MG
1000 TABLET ORAL 3 TIMES DAILY PRN
Qty: 30 TABLET | Refills: 0 | Status: SHIPPED | OUTPATIENT
Start: 2025-08-28 | End: 2025-09-07

## 2025-08-28 ASSESSMENT — LIFESTYLE VARIABLES
HOW MANY STANDARD DRINKS CONTAINING ALCOHOL DO YOU HAVE ON A TYPICAL DAY: PATIENT DOES NOT DRINK
HOW OFTEN DO YOU HAVE A DRINK CONTAINING ALCOHOL: NEVER

## 2025-08-28 ASSESSMENT — PAIN SCALES - GENERAL: PAINLEVEL_OUTOF10: 0

## 2025-08-28 ASSESSMENT — PAIN - FUNCTIONAL ASSESSMENT: PAIN_FUNCTIONAL_ASSESSMENT: 0-10
